# Patient Record
Sex: MALE | Race: WHITE | Employment: OTHER | URBAN - METROPOLITAN AREA
[De-identification: names, ages, dates, MRNs, and addresses within clinical notes are randomized per-mention and may not be internally consistent; named-entity substitution may affect disease eponyms.]

---

## 2018-06-29 ENCOUNTER — HOSPITAL ENCOUNTER (OUTPATIENT)
Age: 53
Discharge: HOME | End: 2018-07-01
Payer: COMMERCIAL

## 2018-06-29 LAB
ALBUMIN SERPL-MCNC: 4.3 G/DL (ref 3.5–5.2)
ALP BLD-CCNC: 65 U/L (ref 40–129)
ALT SERPL-CCNC: 42 U/L (ref 0–40)
ANION GAP SERPL CALCULATED.3IONS-SCNC: 14 MMOL/L (ref 7–16)
AST SERPL-CCNC: 33 U/L (ref 0–39)
BASOPHILS ABSOLUTE: 0.05 E9/L (ref 0–0.2)
BASOPHILS RELATIVE PERCENT: 0.9 % (ref 0–2)
BILIRUB SERPL-MCNC: 1.8 MG/DL (ref 0–1.2)
BUN BLDV-MCNC: 19 MG/DL (ref 6–20)
CALCIUM SERPL-MCNC: 9 MG/DL (ref 8.6–10.2)
CHLORIDE BLD-SCNC: 103 MMOL/L (ref 98–107)
CHOLESTEROL, TOTAL: 135 MG/DL (ref 0–199)
CO2: 26 MMOL/L (ref 22–29)
CREAT SERPL-MCNC: 1 MG/DL (ref 0.7–1.2)
CREATININE URINE: 146 MG/DL (ref 40–278)
EOSINOPHILS ABSOLUTE: 0.05 E9/L (ref 0.05–0.5)
EOSINOPHILS RELATIVE PERCENT: 0.9 % (ref 0–6)
GFR AFRICAN AMERICAN: >60
GFR NON-AFRICAN AMERICAN: >60 ML/MIN/1.73
GLUCOSE BLD-MCNC: 108 MG/DL (ref 74–109)
HBA1C MFR BLD: 6.7 % (ref 4–5.6)
HCT VFR BLD CALC: 50 % (ref 37–54)
HDLC SERPL-MCNC: 27 MG/DL
HEMOGLOBIN: 15.9 G/DL (ref 12.5–16.5)
IMMATURE GRANULOCYTES #: 0.01 E9/L
IMMATURE GRANULOCYTES %: 0.2 % (ref 0–5)
LDL CHOLESTEROL CALCULATED: 56 MG/DL (ref 0–99)
LYMPHOCYTES ABSOLUTE: 1.36 E9/L (ref 1.5–4)
LYMPHOCYTES RELATIVE PERCENT: 23.1 % (ref 20–42)
MCH RBC QN AUTO: 29.8 PG (ref 26–35)
MCHC RBC AUTO-ENTMCNC: 31.8 % (ref 32–34.5)
MCV RBC AUTO: 93.8 FL (ref 80–99.9)
MICROALBUMIN UR-MCNC: <12 MG/L
MICROALBUMIN/CREAT UR-RTO: ABNORMAL (ref 0–30)
MONOCYTES ABSOLUTE: 0.6 E9/L (ref 0.1–0.95)
MONOCYTES RELATIVE PERCENT: 10.2 % (ref 2–12)
NEUTROPHILS ABSOLUTE: 3.81 E9/L (ref 1.8–7.3)
NEUTROPHILS RELATIVE PERCENT: 64.7 % (ref 43–80)
PDW BLD-RTO: 14.5 FL (ref 11.5–15)
PLATELET # BLD: 204 E9/L (ref 130–450)
PMV BLD AUTO: 9.8 FL (ref 7–12)
POTASSIUM SERPL-SCNC: 5.5 MMOL/L (ref 3.5–5)
RBC # BLD: 5.33 E12/L (ref 3.8–5.8)
SODIUM BLD-SCNC: 143 MMOL/L (ref 132–146)
TOTAL PROTEIN: 6.9 G/DL (ref 6.4–8.3)
TRIGL SERPL-MCNC: 260 MG/DL (ref 0–149)
VLDLC SERPL CALC-MCNC: 52 MG/DL
WBC # BLD: 5.9 E9/L (ref 4.5–11.5)

## 2018-06-29 PROCEDURE — 82570 ASSAY OF URINE CREATININE: CPT

## 2018-06-29 PROCEDURE — 80061 LIPID PANEL: CPT

## 2018-06-29 PROCEDURE — 82044 UR ALBUMIN SEMIQUANTITATIVE: CPT

## 2018-06-29 PROCEDURE — 83036 HEMOGLOBIN GLYCOSYLATED A1C: CPT

## 2018-06-29 PROCEDURE — 80053 COMPREHEN METABOLIC PANEL: CPT

## 2018-06-29 PROCEDURE — 85025 COMPLETE CBC W/AUTO DIFF WBC: CPT

## 2018-07-09 ENCOUNTER — HOSPITAL ENCOUNTER (OUTPATIENT)
Age: 53
Discharge: HOME | End: 2018-07-11
Payer: COMMERCIAL

## 2018-07-09 PROCEDURE — 80048 BASIC METABOLIC PNL TOTAL CA: CPT

## 2018-07-10 LAB
ANION GAP SERPL CALCULATED.3IONS-SCNC: 14 MMOL/L (ref 7–16)
BUN BLDV-MCNC: 17 MG/DL (ref 6–20)
CALCIUM SERPL-MCNC: 9 MG/DL (ref 8.6–10.2)
CHLORIDE BLD-SCNC: 104 MMOL/L (ref 98–107)
CO2: 25 MMOL/L (ref 22–29)
CREAT SERPL-MCNC: 1 MG/DL (ref 0.7–1.2)
GFR AFRICAN AMERICAN: >60
GFR NON-AFRICAN AMERICAN: >60 ML/MIN/1.73
GLUCOSE BLD-MCNC: 122 MG/DL (ref 74–109)
POTASSIUM SERPL-SCNC: 4.8 MMOL/L (ref 3.5–5)
SODIUM BLD-SCNC: 143 MMOL/L (ref 132–146)

## 2018-07-17 ENCOUNTER — OFFICE VISIT (OUTPATIENT)
Dept: CARDIOLOGY CLINIC | Age: 53
End: 2018-07-17
Payer: COMMERCIAL

## 2018-07-17 VITALS
DIASTOLIC BLOOD PRESSURE: 76 MMHG | HEART RATE: 69 BPM | BODY MASS INDEX: 44.39 KG/M2 | RESPIRATION RATE: 18 BRPM | WEIGHT: 282.8 LBS | SYSTOLIC BLOOD PRESSURE: 122 MMHG | HEIGHT: 67 IN

## 2018-07-17 DIAGNOSIS — I25.10 CORONARY ARTERY DISEASE, ANGINA PRESENCE UNSPECIFIED, UNSPECIFIED VESSEL OR LESION TYPE, UNSPECIFIED WHETHER NATIVE OR TRANSPLANTED HEART: Primary | ICD-10-CM

## 2018-07-17 DIAGNOSIS — I10 HYPERTENSION, UNSPECIFIED TYPE: ICD-10-CM

## 2018-07-17 PROCEDURE — 99214 OFFICE O/P EST MOD 30 MIN: CPT | Performed by: INTERNAL MEDICINE

## 2018-07-17 PROCEDURE — 93000 ELECTROCARDIOGRAM COMPLETE: CPT | Performed by: INTERNAL MEDICINE

## 2018-07-17 RX ORDER — ATENOLOL 25 MG/1
TABLET ORAL
Qty: 90 TABLET | Refills: 3 | Status: SHIPPED | OUTPATIENT
Start: 2018-07-17 | End: 2019-08-25 | Stop reason: SDUPTHER

## 2018-07-17 RX ORDER — NITROGLYCERIN 0.4 MG/1
0.4 TABLET SUBLINGUAL EVERY 5 MIN PRN
Qty: 25 TABLET | Refills: 1 | Status: SHIPPED
Start: 2018-07-17 | End: 2022-04-28

## 2018-07-17 RX ORDER — ROSUVASTATIN CALCIUM 10 MG/1
TABLET, COATED ORAL
Qty: 90 TABLET | Refills: 3 | Status: SHIPPED | OUTPATIENT
Start: 2018-07-17 | End: 2019-06-08 | Stop reason: SDUPTHER

## 2018-07-17 NOTE — PROGRESS NOTES
 Smoking status: Never Smoker    Smokeless tobacco: Never Used    Alcohol use No    Drug use: No    Sexual activity: Not on file     Other Topics Concern    Not on file     Social History Narrative    No narrative on file     Family History   Problem Relation Age of Onset    Cancer Mother     Cancer Father      Review of Systems:  Heart: as above   Lungs: as above   Eyes: denies changes in vision or discharge. Ears: denies changes in hearing or pain. Nose: denies epistaxis or masses   Throat: denies sore throat or trouble swallowing. Neuro: denies numbness, tingling, tremors. Skin: denies rashes or itching. : denies hematuria, dysuria   GI: denies vomiting, diarrhea   Psych: denies mood changed, anxiety, depression. all others negative. Physical Exam   /76   Pulse 69   Resp 18   Ht 5' 7\" (1.702 m)   Wt 282 lb 12.8 oz (128.3 kg)   BMI 44.29 kg/m²   Constitutional: Oriented to person, place, and time. Well-developed and well-nourished. No distress. Head: Normocephalic and atraumatic. Eyes: EOM are normal. Pupils are equal, round, and reactive to light. Neck: Normal range of motion. Neck supple. No hepatojugular reflux and no JVD present. Carotid bruit is not present. No tracheal deviation present. No thyromegaly present. Cardiovascular: Normal rate, regular rhythm, normal heart sounds and intact distal pulses. Exam reveals no gallop and no friction rub. No murmur heard. Pulmonary/Chest: Effort normal and breath sounds normal. No respiratory distress. No wheezes. No rales. No tenderness. Abdominal: Soft. Bowel sounds are normal. No distension and no mass. No tenderness. No rebound and no guarding. Musculoskeletal: Normal range of motion. No edema and no tenderness. Lymphadenopathy:   No cervical adenopathy. Neurological: Alert and oriented to person, place, and time. Skin: Skin is warm and dry. No rash noted. Not diaphoretic. No erythema.    Psychiatric: Normal mood and affect. Behavior is normal.     EKG:  Normal sinus rhythm, nonspecific ST-T wave changes. ASSESSMENT AND PLAN:  1. CAD:   Prior history of RCA stent and residual LAD disease. Stress 9/12 normal.     Continue same medications. Scot Tracey D.O.   Cardiologist  Cardiology, [facility]

## 2019-01-16 ENCOUNTER — HOSPITAL ENCOUNTER (OUTPATIENT)
Age: 54
Discharge: HOME | End: 2019-01-18
Payer: COMMERCIAL

## 2019-01-16 DIAGNOSIS — E11.9 TYPE 2 DIABETES MELLITUS WITHOUT COMPLICATION, WITHOUT LONG-TERM CURRENT USE OF INSULIN (HCC): ICD-10-CM

## 2019-01-16 DIAGNOSIS — I10 HYPERTENSION, UNSPECIFIED TYPE: ICD-10-CM

## 2019-01-16 PROBLEM — K21.9 GASTROESOPHAGEAL REFLUX DISEASE WITHOUT ESOPHAGITIS: Status: ACTIVE | Noted: 2019-01-16

## 2019-01-16 PROBLEM — E66.813 CLASS 3 SEVERE OBESITY DUE TO EXCESS CALORIES WITH SERIOUS COMORBIDITY AND BODY MASS INDEX (BMI) OF 45.0 TO 49.9 IN ADULT: Status: ACTIVE | Noted: 2019-01-16

## 2019-01-16 PROBLEM — E66.01 CLASS 3 SEVERE OBESITY DUE TO EXCESS CALORIES WITH SERIOUS COMORBIDITY AND BODY MASS INDEX (BMI) OF 45.0 TO 49.9 IN ADULT (HCC): Status: ACTIVE | Noted: 2019-01-16

## 2019-01-16 LAB
ALBUMIN SERPL-MCNC: 4.5 G/DL (ref 3.5–5.2)
ALP BLD-CCNC: 70 U/L (ref 40–129)
ALT SERPL-CCNC: 75 U/L (ref 0–40)
ANION GAP SERPL CALCULATED.3IONS-SCNC: 12 MMOL/L (ref 7–16)
AST SERPL-CCNC: 50 U/L (ref 0–39)
BASOPHILS ABSOLUTE: 0.05 E9/L (ref 0–0.2)
BASOPHILS RELATIVE PERCENT: 1 % (ref 0–2)
BILIRUB SERPL-MCNC: 2 MG/DL (ref 0–1.2)
BUN BLDV-MCNC: 13 MG/DL (ref 6–20)
CALCIUM SERPL-MCNC: 9 MG/DL (ref 8.6–10.2)
CHLORIDE BLD-SCNC: 96 MMOL/L (ref 98–107)
CHOLESTEROL, TOTAL: 141 MG/DL (ref 0–199)
CO2: 29 MMOL/L (ref 22–29)
CREAT SERPL-MCNC: 0.9 MG/DL (ref 0.7–1.2)
CREATININE URINE: 174 MG/DL (ref 40–278)
EOSINOPHILS ABSOLUTE: 0.02 E9/L (ref 0.05–0.5)
EOSINOPHILS RELATIVE PERCENT: 0.4 % (ref 0–6)
GFR AFRICAN AMERICAN: >60
GFR NON-AFRICAN AMERICAN: >60 ML/MIN/1.73
GLUCOSE BLD-MCNC: 200 MG/DL (ref 74–99)
HCT VFR BLD CALC: 49.2 % (ref 37–54)
HDLC SERPL-MCNC: 34 MG/DL
HEMOGLOBIN: 16.2 G/DL (ref 12.5–16.5)
IMMATURE GRANULOCYTES #: 0.02 E9/L
IMMATURE GRANULOCYTES %: 0.4 % (ref 0–5)
LDL CHOLESTEROL CALCULATED: 75 MG/DL (ref 0–99)
LYMPHOCYTES ABSOLUTE: 1.21 E9/L (ref 1.5–4)
LYMPHOCYTES RELATIVE PERCENT: 23.1 % (ref 20–42)
MCH RBC QN AUTO: 29.6 PG (ref 26–35)
MCHC RBC AUTO-ENTMCNC: 32.9 % (ref 32–34.5)
MCV RBC AUTO: 89.9 FL (ref 80–99.9)
MICROALBUMIN UR-MCNC: <12 MG/L
MICROALBUMIN/CREAT UR-RTO: ABNORMAL (ref 0–30)
MONOCYTES ABSOLUTE: 0.46 E9/L (ref 0.1–0.95)
MONOCYTES RELATIVE PERCENT: 8.8 % (ref 2–12)
NEUTROPHILS ABSOLUTE: 3.47 E9/L (ref 1.8–7.3)
NEUTROPHILS RELATIVE PERCENT: 66.3 % (ref 43–80)
PDW BLD-RTO: 13.4 FL (ref 11.5–15)
PLATELET # BLD: 189 E9/L (ref 130–450)
PMV BLD AUTO: 10.1 FL (ref 7–12)
POTASSIUM SERPL-SCNC: 4.4 MMOL/L (ref 3.5–5)
RBC # BLD: 5.47 E12/L (ref 3.8–5.8)
SODIUM BLD-SCNC: 137 MMOL/L (ref 132–146)
TOTAL PROTEIN: 7.1 G/DL (ref 6.4–8.3)
TRIGL SERPL-MCNC: 159 MG/DL (ref 0–149)
VLDLC SERPL CALC-MCNC: 32 MG/DL
WBC # BLD: 5.2 E9/L (ref 4.5–11.5)

## 2019-01-16 PROCEDURE — 83036 HEMOGLOBIN GLYCOSYLATED A1C: CPT

## 2019-01-16 PROCEDURE — 80061 LIPID PANEL: CPT

## 2019-01-16 PROCEDURE — 82044 UR ALBUMIN SEMIQUANTITATIVE: CPT

## 2019-01-16 PROCEDURE — 82570 ASSAY OF URINE CREATININE: CPT

## 2019-01-16 PROCEDURE — 85025 COMPLETE CBC W/AUTO DIFF WBC: CPT

## 2019-01-16 PROCEDURE — 80053 COMPREHEN METABOLIC PANEL: CPT

## 2019-01-17 LAB — HBA1C MFR BLD: 9 % (ref 4–5.6)

## 2019-01-24 LAB — PROSTATE SPECIFIC ANTIGEN: 3.06 NG/ML (ref 0–4)

## 2019-01-28 ENCOUNTER — HOSPITAL ENCOUNTER (OUTPATIENT)
Age: 54
Discharge: HOME | End: 2019-01-30
Payer: COMMERCIAL

## 2019-01-28 DIAGNOSIS — R79.89 ABNORMAL CBC: ICD-10-CM

## 2019-01-28 DIAGNOSIS — R74.8 ELEVATED LIVER ENZYMES: ICD-10-CM

## 2019-01-28 LAB
ALBUMIN SERPL-MCNC: 4.3 G/DL (ref 3.5–5.2)
ALP BLD-CCNC: 61 U/L (ref 40–129)
ALT SERPL-CCNC: 72 U/L (ref 0–40)
ANION GAP SERPL CALCULATED.3IONS-SCNC: 10 MMOL/L (ref 7–16)
AST SERPL-CCNC: 43 U/L (ref 0–39)
BASOPHILS ABSOLUTE: 0.05 E9/L (ref 0–0.2)
BASOPHILS RELATIVE PERCENT: 0.9 % (ref 0–2)
BILIRUB SERPL-MCNC: 1.6 MG/DL (ref 0–1.2)
BUN BLDV-MCNC: 13 MG/DL (ref 6–20)
CALCIUM SERPL-MCNC: 9 MG/DL (ref 8.6–10.2)
CHLORIDE BLD-SCNC: 103 MMOL/L (ref 98–107)
CO2: 28 MMOL/L (ref 22–29)
CREAT SERPL-MCNC: 1 MG/DL (ref 0.7–1.2)
EOSINOPHILS ABSOLUTE: 0.04 E9/L (ref 0.05–0.5)
EOSINOPHILS RELATIVE PERCENT: 0.7 % (ref 0–6)
GFR AFRICAN AMERICAN: >60
GFR NON-AFRICAN AMERICAN: >60 ML/MIN/1.73
GLUCOSE BLD-MCNC: 155 MG/DL (ref 74–99)
HCT VFR BLD CALC: 48.6 % (ref 37–54)
HEMOGLOBIN: 16 G/DL (ref 12.5–16.5)
IMMATURE GRANULOCYTES #: 0.01 E9/L
IMMATURE GRANULOCYTES %: 0.2 % (ref 0–5)
LYMPHOCYTES ABSOLUTE: 1.28 E9/L (ref 1.5–4)
LYMPHOCYTES RELATIVE PERCENT: 23.7 % (ref 20–42)
MCH RBC QN AUTO: 29.6 PG (ref 26–35)
MCHC RBC AUTO-ENTMCNC: 32.9 % (ref 32–34.5)
MCV RBC AUTO: 90 FL (ref 80–99.9)
MONOCYTES ABSOLUTE: 0.52 E9/L (ref 0.1–0.95)
MONOCYTES RELATIVE PERCENT: 9.6 % (ref 2–12)
NEUTROPHILS ABSOLUTE: 3.51 E9/L (ref 1.8–7.3)
NEUTROPHILS RELATIVE PERCENT: 64.9 % (ref 43–80)
PDW BLD-RTO: 13.7 FL (ref 11.5–15)
PLATELET # BLD: 198 E9/L (ref 130–450)
PMV BLD AUTO: 10.7 FL (ref 7–12)
POTASSIUM SERPL-SCNC: 4.9 MMOL/L (ref 3.5–5)
RBC # BLD: 5.4 E12/L (ref 3.8–5.8)
SODIUM BLD-SCNC: 141 MMOL/L (ref 132–146)
TOTAL PROTEIN: 6.7 G/DL (ref 6.4–8.3)
WBC # BLD: 5.4 E9/L (ref 4.5–11.5)

## 2019-01-28 PROCEDURE — 80053 COMPREHEN METABOLIC PANEL: CPT

## 2019-01-28 PROCEDURE — 85025 COMPLETE CBC W/AUTO DIFF WBC: CPT

## 2019-06-07 ENCOUNTER — HOSPITAL ENCOUNTER (OUTPATIENT)
Age: 54
Discharge: HOME | End: 2019-06-09
Payer: COMMERCIAL

## 2019-06-07 DIAGNOSIS — E11.9 TYPE 2 DIABETES MELLITUS WITHOUT COMPLICATION, WITHOUT LONG-TERM CURRENT USE OF INSULIN (HCC): ICD-10-CM

## 2019-06-07 DIAGNOSIS — E78.2 MIXED HYPERLIPIDEMIA: ICD-10-CM

## 2019-06-07 DIAGNOSIS — R74.8 ABNORMAL LIVER ENZYMES: ICD-10-CM

## 2019-06-07 LAB
ALBUMIN SERPL-MCNC: 4.4 G/DL (ref 3.5–5.2)
ALP BLD-CCNC: 69 U/L (ref 40–129)
ALT SERPL-CCNC: 33 U/L (ref 0–40)
ANION GAP SERPL CALCULATED.3IONS-SCNC: 15 MMOL/L (ref 7–16)
AST SERPL-CCNC: 34 U/L (ref 0–39)
BASOPHILS ABSOLUTE: 0.03 E9/L (ref 0–0.2)
BASOPHILS RELATIVE PERCENT: 0.6 % (ref 0–2)
BILIRUB SERPL-MCNC: 1.7 MG/DL (ref 0–1.2)
BUN BLDV-MCNC: 16 MG/DL (ref 6–20)
CALCIUM SERPL-MCNC: 9.4 MG/DL (ref 8.6–10.2)
CHLORIDE BLD-SCNC: 102 MMOL/L (ref 98–107)
CHOLESTEROL, TOTAL: 142 MG/DL (ref 0–199)
CO2: 24 MMOL/L (ref 22–29)
CREAT SERPL-MCNC: 0.9 MG/DL (ref 0.7–1.2)
EOSINOPHILS ABSOLUTE: 0.03 E9/L (ref 0.05–0.5)
EOSINOPHILS RELATIVE PERCENT: 0.6 % (ref 0–6)
FERRITIN: 130 NG/ML
GFR AFRICAN AMERICAN: >60
GFR NON-AFRICAN AMERICAN: >60 ML/MIN/1.73
GLUCOSE BLD-MCNC: 125 MG/DL (ref 74–99)
HBA1C MFR BLD: 6.6 % (ref 4–5.6)
HCT VFR BLD CALC: 49.5 % (ref 37–54)
HDLC SERPL-MCNC: 35 MG/DL
HEMOGLOBIN: 16.2 G/DL (ref 12.5–16.5)
IMMATURE GRANULOCYTES #: 0.01 E9/L
IMMATURE GRANULOCYTES %: 0.2 % (ref 0–5)
INR BLD: 1.1
IRON SATURATION: 28 % (ref 20–55)
IRON: 86 MCG/DL (ref 59–158)
LDL CHOLESTEROL CALCULATED: 79 MG/DL (ref 0–99)
LYMPHOCYTES ABSOLUTE: 1.25 E9/L (ref 1.5–4)
LYMPHOCYTES RELATIVE PERCENT: 24.2 % (ref 20–42)
MCH RBC QN AUTO: 29.3 PG (ref 26–35)
MCHC RBC AUTO-ENTMCNC: 32.7 % (ref 32–34.5)
MCV RBC AUTO: 89.7 FL (ref 80–99.9)
MONOCYTES ABSOLUTE: 0.41 E9/L (ref 0.1–0.95)
MONOCYTES RELATIVE PERCENT: 7.9 % (ref 2–12)
NEUTROPHILS ABSOLUTE: 3.44 E9/L (ref 1.8–7.3)
NEUTROPHILS RELATIVE PERCENT: 66.5 % (ref 43–80)
PDW BLD-RTO: 14 FL (ref 11.5–15)
PLATELET # BLD: 195 E9/L (ref 130–450)
PMV BLD AUTO: 10.4 FL (ref 7–12)
POTASSIUM SERPL-SCNC: 4.4 MMOL/L (ref 3.5–5)
PROTHROMBIN TIME: 12.2 SEC (ref 9.3–12.4)
RBC # BLD: 5.52 E12/L (ref 3.8–5.8)
SODIUM BLD-SCNC: 141 MMOL/L (ref 132–146)
TOTAL IRON BINDING CAPACITY: 304 MCG/DL (ref 250–450)
TOTAL PROTEIN: 7.1 G/DL (ref 6.4–8.3)
TRIGL SERPL-MCNC: 138 MG/DL (ref 0–149)
VLDLC SERPL CALC-MCNC: 28 MG/DL
WBC # BLD: 5.2 E9/L (ref 4.5–11.5)

## 2019-06-07 PROCEDURE — 80053 COMPREHEN METABOLIC PANEL: CPT

## 2019-06-07 PROCEDURE — 82728 ASSAY OF FERRITIN: CPT

## 2019-06-07 PROCEDURE — 86803 HEPATITIS C AB TEST: CPT

## 2019-06-07 PROCEDURE — 83550 IRON BINDING TEST: CPT

## 2019-06-07 PROCEDURE — 82390 ASSAY OF CERULOPLASMIN: CPT

## 2019-06-07 PROCEDURE — 83036 HEMOGLOBIN GLYCOSYLATED A1C: CPT

## 2019-06-07 PROCEDURE — 85025 COMPLETE CBC W/AUTO DIFF WBC: CPT

## 2019-06-07 PROCEDURE — 87340 HEPATITIS B SURFACE AG IA: CPT

## 2019-06-07 PROCEDURE — 80061 LIPID PANEL: CPT

## 2019-06-07 PROCEDURE — 85610 PROTHROMBIN TIME: CPT

## 2019-06-07 PROCEDURE — 83540 ASSAY OF IRON: CPT

## 2019-06-07 PROCEDURE — G0103 PSA SCREENING: HCPCS

## 2019-06-07 PROCEDURE — 82103 ALPHA-1-ANTITRYPSIN TOTAL: CPT

## 2019-06-09 LAB — ALPHA-1 ANTITRYPSIN: 140 MG/DL (ref 90–200)

## 2019-06-10 LAB
HEPATITIS B SURFACE ANTIGEN INTERPRETATION: NORMAL
HEPATITIS C ANTIBODY INTERPRETATION: NORMAL
PROSTATE SPECIFIC ANTIGEN: 3.78 NG/ML (ref 0–4)

## 2019-06-10 RX ORDER — ROSUVASTATIN CALCIUM 10 MG/1
TABLET, COATED ORAL
Qty: 90 TABLET | Refills: 3 | Status: SHIPPED | OUTPATIENT
Start: 2019-06-10 | End: 2020-01-24 | Stop reason: SDUPTHER

## 2019-06-12 LAB — CERULOPLASMIN: 23 MG/DL (ref 15–30)

## 2019-06-14 NOTE — RESULT ENCOUNTER NOTE
Faxed completed to Dr Yessy De La Cruz per order.  Both Chantal and Patsy Duggan spoke with pt regarding results

## 2019-08-26 RX ORDER — ATENOLOL 25 MG/1
TABLET ORAL
Qty: 90 TABLET | Refills: 1 | Status: SHIPPED | OUTPATIENT
Start: 2019-08-26 | End: 2020-01-16

## 2019-10-30 ENCOUNTER — HOSPITAL ENCOUNTER (OUTPATIENT)
Age: 54
Discharge: HOME | End: 2019-11-01
Payer: COMMERCIAL

## 2019-10-30 LAB — PROSTATE SPECIFIC ANTIGEN: 3.98 NG/ML (ref 0–4)

## 2019-10-30 PROCEDURE — 84153 ASSAY OF PSA TOTAL: CPT

## 2019-11-26 ENCOUNTER — HOSPITAL ENCOUNTER (OUTPATIENT)
Age: 54
Discharge: HOME | End: 2019-11-28
Payer: COMMERCIAL

## 2019-11-26 DIAGNOSIS — E11.9 TYPE 2 DIABETES MELLITUS WITHOUT COMPLICATION, WITHOUT LONG-TERM CURRENT USE OF INSULIN (HCC): ICD-10-CM

## 2019-11-26 PROBLEM — G47.33 OSA (OBSTRUCTIVE SLEEP APNEA): Status: ACTIVE | Noted: 2019-11-26

## 2019-11-26 PROBLEM — G44.011 INTRACTABLE EPISODIC CLUSTER HEADACHE: Status: ACTIVE | Noted: 2019-11-26

## 2019-11-26 PROBLEM — R74.8 ABNORMAL LIVER ENZYMES: Status: RESOLVED | Noted: 2019-06-07 | Resolved: 2019-11-26

## 2019-11-26 LAB
ALBUMIN SERPL-MCNC: 4.3 G/DL (ref 3.5–5.2)
ALP BLD-CCNC: 73 U/L (ref 40–129)
ALT SERPL-CCNC: 38 U/L (ref 0–40)
ANION GAP SERPL CALCULATED.3IONS-SCNC: 17 MMOL/L (ref 7–16)
AST SERPL-CCNC: 29 U/L (ref 0–39)
BASOPHILS ABSOLUTE: 0.06 E9/L (ref 0–0.2)
BASOPHILS RELATIVE PERCENT: 0.9 % (ref 0–2)
BILIRUB SERPL-MCNC: 2 MG/DL (ref 0–1.2)
BUN BLDV-MCNC: 14 MG/DL (ref 6–20)
CALCIUM SERPL-MCNC: 9.2 MG/DL (ref 8.6–10.2)
CHLORIDE BLD-SCNC: 99 MMOL/L (ref 98–107)
CHOLESTEROL, TOTAL: 128 MG/DL (ref 0–199)
CO2: 24 MMOL/L (ref 22–29)
CREAT SERPL-MCNC: 1 MG/DL (ref 0.7–1.2)
CREATININE URINE: 237 MG/DL (ref 40–278)
EOSINOPHILS ABSOLUTE: 0.04 E9/L (ref 0.05–0.5)
EOSINOPHILS RELATIVE PERCENT: 0.6 % (ref 0–6)
GFR AFRICAN AMERICAN: >60
GFR NON-AFRICAN AMERICAN: >60 ML/MIN/1.73
GLUCOSE BLD-MCNC: 135 MG/DL (ref 74–99)
HBA1C MFR BLD: 7.9 % (ref 4–5.6)
HCT VFR BLD CALC: 50.2 % (ref 37–54)
HDLC SERPL-MCNC: 34 MG/DL
HEMOGLOBIN: 15.7 G/DL (ref 12.5–16.5)
IMMATURE GRANULOCYTES #: 0.02 E9/L
IMMATURE GRANULOCYTES %: 0.3 % (ref 0–5)
LDL CHOLESTEROL CALCULATED: 68 MG/DL (ref 0–99)
LYMPHOCYTES ABSOLUTE: 1.56 E9/L (ref 1.5–4)
LYMPHOCYTES RELATIVE PERCENT: 23.5 % (ref 20–42)
MCH RBC QN AUTO: 28.6 PG (ref 26–35)
MCHC RBC AUTO-ENTMCNC: 31.3 % (ref 32–34.5)
MCV RBC AUTO: 91.6 FL (ref 80–99.9)
MICROALBUMIN UR-MCNC: <12 MG/L
MICROALBUMIN/CREAT UR-RTO: ABNORMAL (ref 0–30)
MONOCYTES ABSOLUTE: 0.64 E9/L (ref 0.1–0.95)
MONOCYTES RELATIVE PERCENT: 9.7 % (ref 2–12)
NEUTROPHILS ABSOLUTE: 4.31 E9/L (ref 1.8–7.3)
NEUTROPHILS RELATIVE PERCENT: 65 % (ref 43–80)
PDW BLD-RTO: 13.4 FL (ref 11.5–15)
PLATELET # BLD: 219 E9/L (ref 130–450)
PMV BLD AUTO: 10 FL (ref 7–12)
POTASSIUM SERPL-SCNC: 4.8 MMOL/L (ref 3.5–5)
RBC # BLD: 5.48 E12/L (ref 3.8–5.8)
SODIUM BLD-SCNC: 140 MMOL/L (ref 132–146)
TOTAL PROTEIN: 7.1 G/DL (ref 6.4–8.3)
TRIGL SERPL-MCNC: 131 MG/DL (ref 0–149)
VLDLC SERPL CALC-MCNC: 26 MG/DL
WBC # BLD: 6.6 E9/L (ref 4.5–11.5)

## 2019-11-26 PROCEDURE — 83036 HEMOGLOBIN GLYCOSYLATED A1C: CPT

## 2019-11-26 PROCEDURE — 80061 LIPID PANEL: CPT

## 2019-11-26 PROCEDURE — 82044 UR ALBUMIN SEMIQUANTITATIVE: CPT

## 2019-11-26 PROCEDURE — 85025 COMPLETE CBC W/AUTO DIFF WBC: CPT

## 2019-11-26 PROCEDURE — 80053 COMPREHEN METABOLIC PANEL: CPT

## 2019-11-26 PROCEDURE — 82570 ASSAY OF URINE CREATININE: CPT

## 2019-12-11 PROBLEM — R51.9 SUDDEN ONSET OF SEVERE HEADACHE: Status: ACTIVE | Noted: 2019-12-11

## 2019-12-27 ENCOUNTER — HOSPITAL ENCOUNTER (OUTPATIENT)
Age: 54
Discharge: HOME | End: 2019-12-29
Payer: COMMERCIAL

## 2019-12-27 LAB — PROSTATE SPECIFIC ANTIGEN: 4.6 NG/ML (ref 0–4)

## 2019-12-27 PROCEDURE — 84153 ASSAY OF PSA TOTAL: CPT

## 2020-01-03 ENCOUNTER — OFFICE VISIT (OUTPATIENT)
Dept: NEUROLOGY | Age: 55
End: 2020-01-03
Payer: COMMERCIAL

## 2020-01-03 VITALS
RESPIRATION RATE: 18 BRPM | BODY MASS INDEX: 44.71 KG/M2 | HEART RATE: 69 BPM | OXYGEN SATURATION: 96 % | TEMPERATURE: 98.8 F | WEIGHT: 277 LBS | DIASTOLIC BLOOD PRESSURE: 88 MMHG | SYSTOLIC BLOOD PRESSURE: 143 MMHG

## 2020-01-03 PROBLEM — J32.8 CHRONIC FRONTOETHMOIDAL SINUSITIS: Status: ACTIVE | Noted: 2020-01-03

## 2020-01-03 PROCEDURE — 99204 OFFICE O/P NEW MOD 45 MIN: CPT | Performed by: PSYCHIATRY & NEUROLOGY

## 2020-01-03 RX ORDER — TIZANIDINE 4 MG/1
4 TABLET ORAL EVERY 4 HOURS PRN
Qty: 30 TABLET | Refills: 0 | Status: SHIPPED | OUTPATIENT
Start: 2020-01-03 | End: 2020-01-23

## 2020-01-03 ASSESSMENT — ENCOUNTER SYMPTOMS
VOMITING: 0
TROUBLE SWALLOWING: 0
NAUSEA: 0
PHOTOPHOBIA: 0
SHORTNESS OF BREATH: 0

## 2020-01-03 NOTE — PROGRESS NOTES
Recent/remote memory normal. Attention span/concentration normal. Speech fluent. Good comprehension, naming, and repetition. Fund of knowledge appropriate for patient's level of education. Affect normal.    CRANIAL NERVES:  CN I: Not tested. CN II: Fundoscopic exam not performed. CN III, IV, VI: Pupils equal, round and reactive to light; extra ocular movements full and intact. CN V: Facial sensation normal.  CN VII: No facial asymmetry. CN VIII:  Hearing grossly normal bilaterally. No pathologic nystagmus or skew deviation. CN IX, X: Palate elevates symmetrically. CN XI: Shoulder shrug and chin rotation equal with intact strength. CN XII: Tongue protrusion midline. MOTOR: Normal bulk. Tone normal and symmetric throughout. Strength 5/5 throughout. ABNORMAL MOVEMENTS/TREMORS: No     REFLEXES: DTRs 2+; normal and symmetric throughout. Plantar response downgoing. SENSATION: Sensation grossly intact to fine touch, pain/temperature, vibration and position. COORDINATION: Finger-to-nose and heel to shin normal for age and symmetric. Finger tapping and alternating movements normal.    STATION: Negative Romberg. GAIT:  Normal heel, toe and tandem; no ataxia.      DIAGNOSTIC TESTS:   Sodium   Date Value Ref Range Status   11/26/2019 140 132 - 146 mmol/L Final   06/07/2019 141 132 - 146 mmol/L Final   01/28/2019 141 132 - 146 mmol/L Final     Potassium   Date Value Ref Range Status   11/26/2019 4.8 3.5 - 5.0 mmol/L Final   06/07/2019 4.4 3.5 - 5.0 mmol/L Final   01/28/2019 4.9 3.5 - 5.0 mmol/L Final     Chloride   Date Value Ref Range Status   11/26/2019 99 98 - 107 mmol/L Final   06/07/2019 102 98 - 107 mmol/L Final   01/28/2019 103 98 - 107 mmol/L Final     CO2   Date Value Ref Range Status   11/26/2019 24 22 - 29 mmol/L Final   06/07/2019 24 22 - 29 mmol/L Final   01/28/2019 28 22 - 29 mmol/L Final     BUN   Date Value Ref Range Status   11/26/2019 14 6 - 20 mg/dL Final   06/07/2019 16 6 - 20 mg/dL features. · Medication treatment options were discussed in detail with the patient however at this time the patient does not want to take a daily medication for his headaches is after treatment of sinus infection his headaches have significantly improved. · Start on Zanaflex 4 mg as needed for headaches. Side effects of medication were discussed in detail with the patient and he agrees to it. Return if symptoms worsen or fail to improve. Today, I personally spent 45 minutes in direct face-to-face time with the patient, of which greater than 50% was spent in patient education, counseling,about etiology management diagnosis of sinus headache, new daily persistent headaches, medications use and treatment and the side effects. And coordination of care as described above. Patient's current medication list, allergies, problem list and results of all previously ordered testing  were reviewed at today's visit.       Aleisha Busch MD    [facility] Neurology  [address] [place] [address]

## 2020-01-16 RX ORDER — ATENOLOL 25 MG/1
TABLET ORAL
Qty: 90 TABLET | Refills: 3 | Status: SHIPPED
Start: 2020-01-16 | End: 2020-12-17

## 2020-01-23 RX ORDER — TIZANIDINE 4 MG/1
TABLET ORAL
Qty: 30 TABLET | Refills: 0 | Status: SHIPPED | OUTPATIENT
Start: 2020-01-23 | End: 2020-01-24

## 2020-01-24 ENCOUNTER — OFFICE VISIT (OUTPATIENT)
Dept: CARDIOLOGY CLINIC | Age: 55
End: 2020-01-24
Payer: COMMERCIAL

## 2020-01-24 VITALS
DIASTOLIC BLOOD PRESSURE: 84 MMHG | BODY MASS INDEX: 43.16 KG/M2 | WEIGHT: 275 LBS | HEART RATE: 74 BPM | HEIGHT: 67 IN | SYSTOLIC BLOOD PRESSURE: 132 MMHG

## 2020-01-24 PROCEDURE — 99214 OFFICE O/P EST MOD 30 MIN: CPT | Performed by: INTERNAL MEDICINE

## 2020-01-24 PROCEDURE — 93000 ELECTROCARDIOGRAM COMPLETE: CPT | Performed by: INTERNAL MEDICINE

## 2020-01-24 RX ORDER — ROSUVASTATIN CALCIUM 10 MG/1
TABLET, COATED ORAL
Qty: 90 TABLET | Refills: 3 | Status: SHIPPED
Start: 2020-01-24 | End: 2020-04-13

## 2020-01-24 RX ORDER — LOSARTAN POTASSIUM 25 MG/1
25 TABLET ORAL DAILY
Qty: 90 TABLET | Refills: 3 | Status: SHIPPED
Start: 2020-01-24 | End: 2020-06-08 | Stop reason: RX

## 2020-01-24 NOTE — PROGRESS NOTES
TEDDY ALVARADO   OFFICE VISIT  : 65    PROBLEM LIST:  1. Acute non-ST segment elevation myocardial infarction, 2/15/09. A. Heart catheterization, 09:  Near normal LV systolic function, minimal inferobasal hypokinesis, EF 55%. 65-70% mid LAD, 95% critical proximal right coronary artery lesion. Status post balloon angioplasty and placement of 3.0 x 20 mm long Liberte drug-eluting stent. 2. Atypical chest discomfort, 3/8/09. Negative cardiac enzymes. Discharged home for outpatient stress test.  3. Outpatient exercise stress test, 3/9/09:  Fair exercise tolerance with no symptoms or EKG changes of ischemia. Normal perfusion with resting EF 51% increasing to 85% with no wall motion abnormalities. CC: CAD    HPI: The patient presents today for follow up of his CAD status post stent in  with residual LAD disease. Stress normal . No CP or SOB. BP up. Past Medical History:   Diagnosis Date    CAD (coronary artery disease)     HTN (hypertension)     Hyperlipemia     MI, old     2 1/2     No Known Allergies    Current Outpatient Medications   Medication Sig Dispense Refill    rosuvastatin (CRESTOR) 10 MG tablet TAKE 1 TABLET BY MOUTH AT  BEDTIME 90 tablet 3    losartan (COZAAR) 25 MG tablet Take 1 tablet by mouth daily 90 tablet 3    atenolol (TENORMIN) 25 MG tablet TAKE 1 TABLET BY MOUTH  DAILY 90 tablet 3    metFORMIN (GLUCOPHAGE) 500 MG tablet Take 500 mg by mouth daily (with breakfast)      nitroGLYCERIN (NITROSTAT) 0.4 MG SL tablet Place 1 tablet under the tongue every 5 minutes as needed for Chest pain 25 tablet 1    Omega-3 Fatty Acids (FISH OIL PO) Take by mouth      Ascorbic Acid (VITAMIN C) 500 MG tablet Take 500 mg by mouth daily.  aspirin 325 MG tablet Take 325 mg by mouth daily. No current facility-administered medications for this visit.       Social History     Socioeconomic History    Marital status:      Spouse name: Not on file    Number of children: Not on file    Years of education: Not on file    Highest education level: Not on file   Occupational History    Not on file   Social Needs    Financial resource strain: Not on file    Food insecurity:     Worry: Not on file     Inability: Not on file    Transportation needs:     Medical: Not on file     Non-medical: Not on file   Tobacco Use    Smoking status: Never Smoker    Smokeless tobacco: Never Used   Substance and Sexual Activity    Alcohol use: No    Drug use: No    Sexual activity: Not on file   Lifestyle    Physical activity:     Days per week: Not on file     Minutes per session: Not on file    Stress: Not on file   Relationships    Social connections:     Talks on phone: Not on file     Gets together: Not on file     Attends Yarsanism service: Not on file     Active member of club or organization: Not on file     Attends meetings of clubs or organizations: Not on file     Relationship status: Not on file    Intimate partner violence:     Fear of current or ex partner: Not on file     Emotionally abused: Not on file     Physically abused: Not on file     Forced sexual activity: Not on file   Other Topics Concern    Not on file   Social History Narrative    Not on file     Family History   Problem Relation Age of Onset    Cancer Mother     Cancer Father      Review of Systems:  Heart: as above   Lungs: as above   Eyes: denies changes in vision or discharge. Ears: denies changes in hearing or pain. Nose: denies epistaxis or masses   Throat: denies sore throat or trouble swallowing. Neuro: denies numbness, tingling, tremors. Skin: denies rashes or itching. : denies hematuria, dysuria   GI: denies vomiting, diarrhea   Psych: denies mood changed, anxiety, depression. all others negative. Physical Exam   /84   Pulse 74   Ht 5' 7\" (1.702 m)   Wt 275 lb (124.7 kg)   BMI 43.07 kg/m²   Constitutional: Oriented to person, place, and time.  Well-developed and

## 2020-02-03 ENCOUNTER — HOSPITAL ENCOUNTER (OUTPATIENT)
Age: 55
Discharge: HOME | End: 2020-02-05
Payer: COMMERCIAL

## 2020-02-03 LAB — PROSTATE SPECIFIC ANTIGEN: 4.84 NG/ML (ref 0–4)

## 2020-02-03 PROCEDURE — 84153 ASSAY OF PSA TOTAL: CPT

## 2020-02-05 ENCOUNTER — HOSPITAL ENCOUNTER (OUTPATIENT)
Age: 55
Discharge: HOME | End: 2020-02-07
Payer: COMMERCIAL

## 2020-02-05 LAB
ANION GAP SERPL CALCULATED.3IONS-SCNC: 15 MMOL/L (ref 7–16)
BUN BLDV-MCNC: 15 MG/DL (ref 6–20)
CALCIUM SERPL-MCNC: 9.9 MG/DL (ref 8.6–10.2)
CHLORIDE BLD-SCNC: 99 MMOL/L (ref 98–107)
CO2: 24 MMOL/L (ref 22–29)
CREAT SERPL-MCNC: 0.9 MG/DL (ref 0.7–1.2)
GFR AFRICAN AMERICAN: >60
GFR NON-AFRICAN AMERICAN: >60 ML/MIN/1.73
GLUCOSE BLD-MCNC: 314 MG/DL (ref 74–99)
POTASSIUM SERPL-SCNC: 4.6 MMOL/L (ref 3.5–5)
SODIUM BLD-SCNC: 138 MMOL/L (ref 132–146)

## 2020-02-05 PROCEDURE — 80048 BASIC METABOLIC PNL TOTAL CA: CPT

## 2020-02-06 ENCOUNTER — TELEPHONE (OUTPATIENT)
Dept: CARDIOLOGY CLINIC | Age: 55
End: 2020-02-06

## 2020-02-06 NOTE — TELEPHONE ENCOUNTER
----- Message from Raquel Tello DO sent at 2/6/2020 10:21 AM EST -----  Labs okay except blood sugar was up. Raquel Tello D.O.   Cardiologist  Cardiology, [facility]

## 2020-02-27 ENCOUNTER — HOSPITAL ENCOUNTER (OUTPATIENT)
Age: 55
Discharge: HOME | End: 2020-02-29
Payer: COMMERCIAL

## 2020-02-27 LAB — PROSTATE SPECIFIC ANTIGEN: 4.21 NG/ML (ref 0–4)

## 2020-02-27 PROCEDURE — 84153 ASSAY OF PSA TOTAL: CPT

## 2020-04-13 RX ORDER — ROSUVASTATIN CALCIUM 10 MG/1
TABLET, COATED ORAL
Qty: 90 TABLET | Refills: 3 | Status: SHIPPED
Start: 2020-04-13 | End: 2021-03-08

## 2020-04-15 ENCOUNTER — HOSPITAL ENCOUNTER (OUTPATIENT)
Age: 55
Discharge: HOME | End: 2020-04-17
Payer: COMMERCIAL

## 2020-04-15 LAB — PROSTATE SPECIFIC ANTIGEN: 4.15 NG/ML (ref 0–4)

## 2020-04-15 PROCEDURE — 84153 ASSAY OF PSA TOTAL: CPT

## 2020-06-08 ENCOUNTER — TELEPHONE (OUTPATIENT)
Dept: CARDIOLOGY CLINIC | Age: 55
End: 2020-06-08

## 2020-06-08 RX ORDER — LISINOPRIL 5 MG/1
5 TABLET ORAL DAILY
Qty: 30 TABLET | Refills: 5 | Status: SHIPPED
Start: 2020-06-08 | End: 2020-09-23 | Stop reason: SDUPTHER

## 2020-06-08 RX ORDER — LISINOPRIL 5 MG/1
5 TABLET ORAL DAILY
COMMUNITY
End: 2020-06-08 | Stop reason: SDUPTHER

## 2020-08-25 ENCOUNTER — HOSPITAL ENCOUNTER (OUTPATIENT)
Age: 55
Discharge: HOME | End: 2020-08-27
Payer: COMMERCIAL

## 2020-08-25 LAB — PROSTATE SPECIFIC ANTIGEN: 4.35 NG/ML (ref 0–4)

## 2020-08-25 PROCEDURE — 84153 ASSAY OF PSA TOTAL: CPT

## 2020-09-21 ENCOUNTER — HOSPITAL ENCOUNTER (OUTPATIENT)
Age: 55
Discharge: HOME | End: 2020-09-23
Payer: COMMERCIAL

## 2020-09-21 LAB
ALBUMIN SERPL-MCNC: 4.4 G/DL (ref 3.5–5.2)
ALP BLD-CCNC: 68 U/L (ref 40–129)
ALT SERPL-CCNC: 25 U/L (ref 0–40)
ANION GAP SERPL CALCULATED.3IONS-SCNC: 10 MMOL/L (ref 7–16)
AST SERPL-CCNC: 28 U/L (ref 0–39)
BASOPHILS ABSOLUTE: 0.05 E9/L (ref 0–0.2)
BASOPHILS RELATIVE PERCENT: 0.7 % (ref 0–2)
BILIRUB SERPL-MCNC: 1.6 MG/DL (ref 0–1.2)
BUN BLDV-MCNC: 15 MG/DL (ref 6–20)
CALCIUM SERPL-MCNC: 9.8 MG/DL (ref 8.6–10.2)
CHLORIDE BLD-SCNC: 102 MMOL/L (ref 98–107)
CHOLESTEROL, TOTAL: 136 MG/DL (ref 0–199)
CO2: 27 MMOL/L (ref 22–29)
CREAT SERPL-MCNC: 0.9 MG/DL (ref 0.7–1.2)
EOSINOPHILS ABSOLUTE: 0.06 E9/L (ref 0.05–0.5)
EOSINOPHILS RELATIVE PERCENT: 0.9 % (ref 0–6)
GFR AFRICAN AMERICAN: >60
GFR NON-AFRICAN AMERICAN: >60 ML/MIN/1.73
GLUCOSE FASTING: 175 MG/DL (ref 74–99)
HBA1C MFR BLD: 7.3 % (ref 4–5.6)
HCT VFR BLD CALC: 49.1 % (ref 37–54)
HDLC SERPL-MCNC: 42 MG/DL
HEMOGLOBIN: 15.7 G/DL (ref 12.5–16.5)
IMMATURE GRANULOCYTES #: 0.01 E9/L
IMMATURE GRANULOCYTES %: 0.1 % (ref 0–5)
LDL CHOLESTEROL CALCULATED: 69 MG/DL (ref 0–99)
LYMPHOCYTES ABSOLUTE: 1.51 E9/L (ref 1.5–4)
LYMPHOCYTES RELATIVE PERCENT: 22.4 % (ref 20–42)
MCH RBC QN AUTO: 29.4 PG (ref 26–35)
MCHC RBC AUTO-ENTMCNC: 32 % (ref 32–34.5)
MCV RBC AUTO: 91.9 FL (ref 80–99.9)
MONOCYTES ABSOLUTE: 0.54 E9/L (ref 0.1–0.95)
MONOCYTES RELATIVE PERCENT: 8 % (ref 2–12)
NEUTROPHILS ABSOLUTE: 4.58 E9/L (ref 1.8–7.3)
NEUTROPHILS RELATIVE PERCENT: 67.9 % (ref 43–80)
PDW BLD-RTO: 14 FL (ref 11.5–15)
PLATELET # BLD: 191 E9/L (ref 130–450)
PMV BLD AUTO: 10.3 FL (ref 7–12)
POTASSIUM SERPL-SCNC: 5 MMOL/L (ref 3.5–5)
RBC # BLD: 5.34 E12/L (ref 3.8–5.8)
SODIUM BLD-SCNC: 139 MMOL/L (ref 132–146)
TOTAL PROTEIN: 6.8 G/DL (ref 6.4–8.3)
TRIGL SERPL-MCNC: 124 MG/DL (ref 0–149)
TSH SERPL DL<=0.05 MIU/L-ACNC: 1.21 UIU/ML (ref 0.27–4.2)
VLDLC SERPL CALC-MCNC: 25 MG/DL
WBC # BLD: 6.8 E9/L (ref 4.5–11.5)

## 2020-09-21 PROCEDURE — 83036 HEMOGLOBIN GLYCOSYLATED A1C: CPT

## 2020-09-21 PROCEDURE — 80061 LIPID PANEL: CPT

## 2020-09-21 PROCEDURE — 85025 COMPLETE CBC W/AUTO DIFF WBC: CPT

## 2020-09-21 PROCEDURE — 84443 ASSAY THYROID STIM HORMONE: CPT

## 2020-09-21 PROCEDURE — 80053 COMPREHEN METABOLIC PANEL: CPT

## 2020-09-23 PROBLEM — R51.9 SUDDEN ONSET OF SEVERE HEADACHE: Status: RESOLVED | Noted: 2019-12-11 | Resolved: 2020-09-23

## 2020-09-23 PROBLEM — G44.011 INTRACTABLE EPISODIC CLUSTER HEADACHE: Status: RESOLVED | Noted: 2019-11-26 | Resolved: 2020-09-23

## 2020-09-23 PROBLEM — J32.8 CHRONIC FRONTOETHMOIDAL SINUSITIS: Status: RESOLVED | Noted: 2020-01-03 | Resolved: 2020-09-23

## 2020-10-27 PROBLEM — S86.812A STRAIN OF LEFT CALF MUSCLE: Status: ACTIVE | Noted: 2020-10-27

## 2020-12-17 RX ORDER — ATENOLOL 25 MG/1
TABLET ORAL
Qty: 90 TABLET | Refills: 0 | Status: SHIPPED
Start: 2020-12-17 | End: 2021-03-08

## 2021-03-08 RX ORDER — ATENOLOL 25 MG/1
TABLET ORAL
Qty: 90 TABLET | Refills: 0 | Status: SHIPPED | OUTPATIENT
Start: 2021-03-08 | End: 2021-03-29 | Stop reason: SDUPTHER

## 2021-03-08 RX ORDER — ROSUVASTATIN CALCIUM 10 MG/1
TABLET, COATED ORAL
Qty: 90 TABLET | Refills: 0 | Status: SHIPPED | OUTPATIENT
Start: 2021-03-08 | End: 2021-03-29 | Stop reason: SDUPTHER

## 2021-03-29 ENCOUNTER — OFFICE VISIT (OUTPATIENT)
Dept: CARDIOLOGY CLINIC | Age: 56
End: 2021-03-29
Payer: COMMERCIAL

## 2021-03-29 VITALS
RESPIRATION RATE: 16 BRPM | BODY MASS INDEX: 40.53 KG/M2 | SYSTOLIC BLOOD PRESSURE: 124 MMHG | HEIGHT: 67 IN | WEIGHT: 258.2 LBS | HEART RATE: 71 BPM | DIASTOLIC BLOOD PRESSURE: 80 MMHG

## 2021-03-29 DIAGNOSIS — I25.10 CORONARY ARTERY DISEASE, ANGINA PRESENCE UNSPECIFIED, UNSPECIFIED VESSEL OR LESION TYPE, UNSPECIFIED WHETHER NATIVE OR TRANSPLANTED HEART: Primary | ICD-10-CM

## 2021-03-29 DIAGNOSIS — I10 HYPERTENSION, UNSPECIFIED TYPE: ICD-10-CM

## 2021-03-29 PROCEDURE — 99214 OFFICE O/P EST MOD 30 MIN: CPT | Performed by: INTERNAL MEDICINE

## 2021-03-29 PROCEDURE — 93000 ELECTROCARDIOGRAM COMPLETE: CPT | Performed by: INTERNAL MEDICINE

## 2021-03-29 RX ORDER — ATENOLOL 25 MG/1
25 TABLET ORAL DAILY
Qty: 90 TABLET | Refills: 3 | Status: SHIPPED
Start: 2021-03-29 | End: 2021-04-20 | Stop reason: SDUPTHER

## 2021-03-29 RX ORDER — LISINOPRIL 5 MG/1
5 TABLET ORAL DAILY
Qty: 90 TABLET | Refills: 3 | Status: SHIPPED
Start: 2021-03-29 | End: 2021-08-06 | Stop reason: SDUPTHER

## 2021-03-29 RX ORDER — ROSUVASTATIN CALCIUM 10 MG/1
10 TABLET, COATED ORAL DAILY
Qty: 90 TABLET | Refills: 3 | Status: SHIPPED
Start: 2021-03-29 | End: 2021-12-29

## 2021-03-29 NOTE — PROGRESS NOTES
children: Not on file    Years of education: Not on file    Highest education level: Not on file   Occupational History    Not on file   Social Needs    Financial resource strain: Not on file    Food insecurity     Worry: Not on file     Inability: Not on file    Transportation needs     Medical: Not on file     Non-medical: Not on file   Tobacco Use    Smoking status: Never Smoker    Smokeless tobacco: Never Used   Substance and Sexual Activity    Alcohol use: No    Drug use: No    Sexual activity: Not on file   Lifestyle    Physical activity     Days per week: Not on file     Minutes per session: Not on file    Stress: Not on file   Relationships    Social connections     Talks on phone: Not on file     Gets together: Not on file     Attends Rastafarian service: Not on file     Active member of club or organization: Not on file     Attends meetings of clubs or organizations: Not on file     Relationship status: Not on file    Intimate partner violence     Fear of current or ex partner: Not on file     Emotionally abused: Not on file     Physically abused: Not on file     Forced sexual activity: Not on file   Other Topics Concern    Not on file   Social History Narrative    Not on file     Family History   Problem Relation Age of Onset    Cancer Mother     Cancer Father      Review of Systems:  Heart: as above   Lungs: as above   Eyes: denies changes in vision or discharge. Ears: denies changes in hearing or pain. Nose: denies epistaxis or masses   Throat: denies sore throat or trouble swallowing. Neuro: denies numbness, tingling, tremors. Skin: denies rashes or itching. : denies hematuria, dysuria   GI: denies vomiting, diarrhea   Psych: denies mood changed, anxiety, depression. all others negative. Physical Exam   /80   Pulse 71   Resp 16   Ht 5' 7\" (1.702 m)   Wt 258 lb 3.2 oz (117.1 kg)   BMI 40.44 kg/m²   Constitutional: Oriented to person, place, and time. Well-developed and well-nourished. No distress. Head: Normocephalic and atraumatic. Eyes: EOM are normal. Pupils are equal, round, and reactive to light. Neck: Normal range of motion. Neck supple. No hepatojugular reflux and no JVD present. Carotid bruit is not present. No tracheal deviation present. No thyromegaly present. Cardiovascular: Normal rate, regular rhythm, normal heart sounds and intact distal pulses. Exam reveals no gallop and no friction rub. No murmur heard. Pulmonary/Chest: Effort normal and breath sounds normal. No respiratory distress. No wheezes. No rales. No tenderness. Abdominal: Soft. Bowel sounds are normal. No distension and no mass. No tenderness. No rebound and no guarding. Musculoskeletal: Normal range of motion. No edema and no tenderness. Lymphadenopathy:   No cervical adenopathy. Neurological: Alert and oriented to person, place, and time. Skin: Skin is warm and dry. No rash noted. Not diaphoretic. No erythema. Psychiatric: Normal mood and affect. Behavior is normal.     EKG:  Normal sinus rhythm, nonspecific ST-T wave changes. ASSESSMENT AND PLAN:  1. CAD:   Prior history of RCA stent and residual LAD disease. Stress 9/12 normal.     Continue ASA/BB/statin. 2. HTN: Observe on ACEI/BB. 3. DM: Per PCP. 4. Dizziness: Hydration. Mira Nguyen D.O.   Cardiologist  Cardiology, [facility]

## 2021-04-14 PROBLEM — S86.812A STRAIN OF LEFT CALF MUSCLE: Status: RESOLVED | Noted: 2020-10-27 | Resolved: 2021-04-14

## 2021-04-19 ENCOUNTER — IMMUNIZATION (OUTPATIENT)
Dept: PRIMARY CARE CLINIC | Age: 56
End: 2021-04-19
Payer: COMMERCIAL

## 2021-04-19 PROCEDURE — 0011A COVID-19, MODERNA VACCINE 100MCG/0.5ML DOSE: CPT | Performed by: PHYSICIAN ASSISTANT

## 2021-04-19 PROCEDURE — 91301 COVID-19, MODERNA VACCINE 100MCG/0.5ML DOSE: CPT | Performed by: PHYSICIAN ASSISTANT

## 2021-05-17 ENCOUNTER — IMMUNIZATION (OUTPATIENT)
Dept: PRIMARY CARE CLINIC | Age: 56
End: 2021-05-17
Payer: COMMERCIAL

## 2021-05-17 PROCEDURE — 91301 COVID-19, MODERNA VACCINE 100MCG/0.5ML DOSE: CPT | Performed by: PHYSICIAN ASSISTANT

## 2021-05-17 PROCEDURE — 0012A COVID-19, MODERNA VACCINE 100MCG/0.5ML DOSE: CPT | Performed by: PHYSICIAN ASSISTANT

## 2021-12-29 RX ORDER — ATENOLOL 25 MG/1
25 TABLET ORAL DAILY
Qty: 90 TABLET | Refills: 0 | Status: SHIPPED
Start: 2021-12-29 | End: 2022-01-25 | Stop reason: SDUPTHER

## 2021-12-29 RX ORDER — ROSUVASTATIN CALCIUM 10 MG/1
10 TABLET, COATED ORAL DAILY
Qty: 90 TABLET | Refills: 0 | Status: SHIPPED
Start: 2021-12-29 | End: 2022-01-25 | Stop reason: SDUPTHER

## 2021-12-30 ENCOUNTER — IMMUNIZATION (OUTPATIENT)
Dept: PRIMARY CARE CLINIC | Age: 56
End: 2021-12-30
Payer: COMMERCIAL

## 2021-12-30 PROCEDURE — 91306 COVID-19, MODERNA BOOSTER VACCINE 0.25ML DOSE: CPT | Performed by: NURSE PRACTITIONER

## 2021-12-30 PROCEDURE — 0064A COVID-19, MODERNA BOOSTER VACCINE 0.25ML DOSE: CPT | Performed by: NURSE PRACTITIONER

## 2022-01-25 RX ORDER — ATENOLOL 25 MG/1
25 TABLET ORAL DAILY
Qty: 90 TABLET | Refills: 0 | Status: SHIPPED
Start: 2022-01-25 | End: 2022-04-28 | Stop reason: SDUPTHER

## 2022-01-25 RX ORDER — ROSUVASTATIN CALCIUM 10 MG/1
10 TABLET, COATED ORAL DAILY
Qty: 90 TABLET | Refills: 0 | Status: SHIPPED
Start: 2022-01-25 | End: 2022-04-28 | Stop reason: SDUPTHER

## 2022-04-28 ENCOUNTER — OFFICE VISIT (OUTPATIENT)
Dept: CARDIOLOGY CLINIC | Age: 57
End: 2022-04-28
Payer: COMMERCIAL

## 2022-04-28 VITALS
SYSTOLIC BLOOD PRESSURE: 124 MMHG | HEIGHT: 67 IN | BODY MASS INDEX: 42.6 KG/M2 | DIASTOLIC BLOOD PRESSURE: 80 MMHG | HEART RATE: 64 BPM | WEIGHT: 271.4 LBS | RESPIRATION RATE: 18 BRPM

## 2022-04-28 DIAGNOSIS — I25.10 CORONARY ARTERY DISEASE, UNSPECIFIED VESSEL OR LESION TYPE, UNSPECIFIED WHETHER ANGINA PRESENT, UNSPECIFIED WHETHER NATIVE OR TRANSPLANTED HEART: Primary | ICD-10-CM

## 2022-04-28 DIAGNOSIS — I10 HYPERTENSION, UNSPECIFIED TYPE: ICD-10-CM

## 2022-04-28 PROCEDURE — 99214 OFFICE O/P EST MOD 30 MIN: CPT | Performed by: INTERNAL MEDICINE

## 2022-04-28 PROCEDURE — 93000 ELECTROCARDIOGRAM COMPLETE: CPT | Performed by: INTERNAL MEDICINE

## 2022-04-28 RX ORDER — LISINOPRIL 5 MG/1
5 TABLET ORAL DAILY
Qty: 90 TABLET | Refills: 3 | Status: SHIPPED
Start: 2022-04-28 | End: 2022-09-28 | Stop reason: SDUPTHER

## 2022-04-28 RX ORDER — SILDENAFIL 100 MG/1
100 TABLET, FILM COATED ORAL DAILY PRN
Qty: 10 TABLET | Refills: 5 | Status: SHIPPED | OUTPATIENT
Start: 2022-04-28

## 2022-04-28 RX ORDER — ATENOLOL 25 MG/1
25 TABLET ORAL DAILY
Qty: 90 TABLET | Refills: 3 | Status: SHIPPED | OUTPATIENT
Start: 2022-04-28

## 2022-04-28 RX ORDER — ROSUVASTATIN CALCIUM 10 MG/1
10 TABLET, COATED ORAL DAILY
Qty: 90 TABLET | Refills: 3 | Status: SHIPPED | OUTPATIENT
Start: 2022-04-28

## 2022-04-28 NOTE — PROGRESS NOTES
TEDDY ALVARADO   OFFICE VISIT  : 65    PROBLEM LIST:  1. Acute non-ST segment elevation myocardial infarction, 2/15/09. A. Heart catheterization, 09:  Near normal LV systolic function, minimal inferobasal hypokinesis, EF 55%. 65-70% mid LAD, 95% critical proximal right coronary artery lesion. Status post balloon angioplasty and placement of 3.0 x 20 mm long Liberte drug-eluting stent. 2. Atypical chest discomfort, 3/8/09. Negative cardiac enzymes. Discharged home for outpatient stress test.  3. Outpatient exercise stress test, 3/9/09:  Fair exercise tolerance with no symptoms or EKG changes of ischemia. Normal perfusion with resting EF 51% increasing to 85% with no wall motion abnormalities. CC: CAD    HPI: The patient presents today for follow up of his CAD status post stent in  with residual LAD disease. Stress normal . No CP or SOB. Some vague dizziness. BP okay. Past Medical History:   Diagnosis Date    CAD (coronary artery disease)     HTN (hypertension)     Hyperlipemia     MI, old     2 1/2     No Known Allergies    Current Outpatient Medications   Medication Sig Dispense Refill    glipiZIDE (GLUCOTROL XL) 10 MG extended release tablet Take 1 tablet by mouth daily (with breakfast) New dose 2021 90 tablet 1    lisinopril (PRINIVIL;ZESTRIL) 5 MG tablet Take 1 tablet by mouth daily 90 tablet 1    atenolol (TENORMIN) 25 MG tablet Take 1 tablet by mouth daily 90 tablet 0    rosuvastatin (CRESTOR) 10 MG tablet Take 1 tablet by mouth daily 90 tablet 0    metFORMIN (GLUCOPHAGE) 500 MG tablet Take 1 tablet by mouth 2 times daily (with meals) New directions as of 2021 180 tablet 0    nitroGLYCERIN (NITROSTAT) 0.4 MG SL tablet Place 1 tablet under the tongue every 5 minutes as needed for Chest pain 25 tablet 1    Ascorbic Acid (VITAMIN C) 500 MG tablet Take 500 mg by mouth daily.  aspirin 325 MG tablet Take 325 mg by mouth daily.         lisinopril (PRINIVIL;ZESTRIL) 5 MG tablet Take 1 tablet by mouth daily (Patient not taking: Reported on 4/28/2022) 7 tablet 0    glipiZIDE (GLUCOTROL XL) 10 MG extended release tablet Take 1 tablet by mouth daily (Patient not taking: Reported on 4/28/2022) 7 tablet 0     No current facility-administered medications for this visit. Social History     Socioeconomic History    Marital status:      Spouse name: Not on file    Number of children: Not on file    Years of education: Not on file    Highest education level: Not on file   Occupational History    Not on file   Tobacco Use    Smoking status: Never Smoker    Smokeless tobacco: Never Used   Vaping Use    Vaping Use: Never used   Substance and Sexual Activity    Alcohol use: No    Drug use: No    Sexual activity: Not on file   Other Topics Concern    Not on file   Social History Narrative    Not on file     Social Determinants of Health     Financial Resource Strain: Low Risk     Difficulty of Paying Living Expenses: Not hard at all   Food Insecurity: No Food Insecurity    Worried About [address] in the Last Year: Never true    [address] in the Last Year: Never true   Transportation Needs:     Lack of Transportation (Medical): Not on file    Lack of Transportation (Non-Medical):  Not on file   Physical Activity:     Days of Exercise per Week: Not on file    Minutes of Exercise per Session: Not on file   Stress:     Feeling of Stress : Not on file   Social Connections:     Frequency of Communication with Friends and Family: Not on file    Frequency of Social Gatherings with Friends and Family: Not on file    Attends Spiritism Services: Not on file    Active Member of Clubs or Organizations: Not on file    Attends Club or Organization Meetings: Not on file    Marital Status: Not on file   Intimate Partner Violence:     Fear of Current or Ex-Partner: Not on file    Emotionally Abused: Not on file    Physically Abused: Not mood and affect. Behavior is normal.     EKG:  Normal sinus rhythm, nonspecific ST-T wave changes. ASSESSMENT AND PLAN:  1. CAD:   Prior history of RCA stent and residual LAD disease. Stress 9/12 normal.     Continue ASA/BB/statin. 2. HTN: Observe on ACEI/BB. 3. DM: Per PCP. 4. ED: Trial sildenafil. Alban Arevalo D.O.   Cardiologist  Cardiology, [facility]

## 2022-07-30 ENCOUNTER — HOSPITAL ENCOUNTER (EMERGENCY)
Age: 57
Discharge: HOME | End: 2022-07-30
Payer: COMMERCIAL

## 2022-07-30 VITALS
HEIGHT: 67 IN | HEART RATE: 80 BPM | OXYGEN SATURATION: 98 % | SYSTOLIC BLOOD PRESSURE: 150 MMHG | RESPIRATION RATE: 20 BRPM | DIASTOLIC BLOOD PRESSURE: 70 MMHG | WEIGHT: 249 LBS | TEMPERATURE: 99.6 F | BODY MASS INDEX: 39.08 KG/M2

## 2022-07-30 DIAGNOSIS — U07.1 UPPER RESPIRATORY TRACT INFECTION DUE TO COVID-19 VIRUS: Primary | ICD-10-CM

## 2022-07-30 DIAGNOSIS — J06.9 UPPER RESPIRATORY TRACT INFECTION DUE TO COVID-19 VIRUS: Primary | ICD-10-CM

## 2022-07-30 PROCEDURE — 99282 EMERGENCY DEPT VISIT SF MDM: CPT

## 2022-07-30 ASSESSMENT — PAIN - FUNCTIONAL ASSESSMENT: PAIN_FUNCTIONAL_ASSESSMENT: NONE - DENIES PAIN

## 2022-07-30 ASSESSMENT — LIFESTYLE VARIABLES
HOW MANY STANDARD DRINKS CONTAINING ALCOHOL DO YOU HAVE ON A TYPICAL DAY: PATIENT DOES NOT DRINK
HOW OFTEN DO YOU HAVE A DRINK CONTAINING ALCOHOL: NEVER

## 2022-07-31 NOTE — ED PROVIDER NOTES
[facility]  Department of Emergency Medicine   ED  Encounter Note  Admit Date/RoomTime: 2022  8:56 PM  ED Room:   NAME: Elizabeth Stinson  : 1965  MRN: [id number]     Chief Complaint:  Sinusitis (Sinus congestion and cough starting last week, 1 COVID test at home//)    HISTORY OF PRESENT ILLNESS        Elizabeth Stinson is a 64 y.o. male who presents to the ED for COVID evaluation. Patient states he started with a cold last week. Sinus congestion. Runny nose. Cough. Did take 2 at home COVID test last week that came back negative. He states the cold-like symptoms continued. Started coughing. Then took another COVID test today and it came back positive. He does state he talked to his family doctor last week with a cold started and was on a Z-Alban. That did not help. Then called her again yesterday complaining of the cough and they prescribed Tessalon Perles, which have helped. He denies any fevers or chills. Denies loss of taste or loss of smell. Denies shortness of breath. Cough is nonproductive. Denies any nausea, vomiting or diarrhea. Patient is concerned because his wife is immunocompromised and just wanted to talk about COVID illness in the house. Symptoms are mild to moderate in severity. Relief with the Tessalon Perles. Has not taken any cold or cough medicine. ROS   Pertinent positives and negatives are stated within HPI, all other systems reviewed and are negative. Past Medical History:  has a past medical history of CAD (coronary artery disease), HTN (hypertension), Hyperlipemia, and MI, old. Surgical History:  has a past surgical history that includes Coronary angioplasty with stent. Social History:  reports that he has never smoked. He has never used smokeless tobacco. He reports that he does not drink alcohol and does not use drugs. Family History: family history includes Cancer in his father and mother.      Allergies: Patient has no known allergies. PHYSICAL EXAM   Oxygen Saturation Interpretation: Normal on room air analysis. ED Triage Vitals [07/30/22 2053]   BP Temp Temp Source Heart Rate Resp SpO2 Height Weight   (!) 150/70 99.6 °F (37.6 °C) Oral 80 20 98 % 5' 7\" (1.702 m) 249 lb (112.9 kg)       General:  NAD. Alert and Oriented. Well-appearing. Skin:  Warm, dry. No rashes. Head:  Normocephalic. Atraumatic. Eyes:  EOMI. Conjunctiva normal.  ENT:  Oral mucosa moist.  Airway patent. Posterior pharynx pink without erythema, without swelling, without exudate. Uvula midline with equal rise and without edema. Bilateral ear canals patent with minimal cerumen. Bilateral TM's without erythema, without bulging. Nasal turbinates with minimal swelling. Frontal and maxillary sinuses nontender to palpation. Neck:  Supple. Normal ROM. Respiratory:  No respiratory distress. No labored breathing. Lungs clear without rales, rhonchi or wheezing. Cardiovascular:  Regular rate. No Murmur. No peripheral edema. Extremities warm and good color. Extremities:  Normal ROM. Nontender to palpation. Atraumatic. Back:  Normal ROM. Nontender to palpation. Neuro:  Alert and Oriented to person, place, time and situation. Normal LOC. Moves all extremities. Speech fluent. Psych:  Calm and Cooperative. Normal thought process. Normal judgement. Lab / Imaging Results   (All laboratory and radiology results have been personally reviewed by myself)  Labs:  No results found for this visit on 07/30/22. Imaging: All Radiology results interpreted by Radiologist unless otherwise noted. No orders to display       ED Course / Medical Decision Making   Medications - No data to display     Re-examination:  7/30/22       Time:   Patients condition . Consult(s):   None    Procedure(s):   None    MDM:   Discussed with him COVID illness.   It is a risk him being in the home with his wife, but all he can really do is spray everything with Lysol and wipe with antibacterial wipes. Continue the [employer]. He can also add on Tylenol cold and sinus medication for the congestions. Explained to him this mimics a cold and to just let it run its course with. Plan of Care/Counseling:  Javi Merrill reviewed today's visit with the patient in addition to providing specific details for the plan of care and counseling regarding the diagnosis and prognosis. Questions are answered at this time and are agreeable with the plan. ASSESSMENT     1. Upper respiratory tract infection due to COVID-19 virus      PLAN   Discharged home. Patient condition is good    New Medications     New Prescriptions    No medications on file     Electronically signed by KRISHNA Merrill   DD: 7/30/22  **This report was transcribed using voice recognition software. Every effort was made to ensure accuracy; however, inadvertent computerized transcription errors may be present.   END OF ED PROVIDER NOTE       Javi Merrill  07/30/22 [id number]

## 2022-07-31 NOTE — ED NOTES
Discharge instructions given. Patient states verbal understanding. Ambulatory to exit.        Iva Grant RN  07/30/22 [id number]

## 2022-12-15 ENCOUNTER — HOSPITAL ENCOUNTER (OUTPATIENT)
Age: 57
Discharge: HOME | End: 2022-12-17

## 2023-01-25 ENCOUNTER — TELEPHONE (OUTPATIENT)
Dept: CASE MANAGEMENT | Age: 58
End: 2023-01-25

## 2023-01-25 NOTE — TELEPHONE ENCOUNTER
Met with patient during his initial consultation with Dr Nydia Adams for his recent Prostate Cancer diagnosis. Introduced myself and explained my role with patients receiving treatment at our center. Patient was friendly and receptive. He states he is doing good and got all of his questions answered during his visit with the doctor and nurse today. He denies any needs at this time and has no transportation needs. Copy of his pathology findings given including cancer type. Next steps include Hormone Therapy with Orgovyx, Space OAR, CT simulation and Radiation planning and treatments per Dr Kristian Byers recommendations and follow up care. Provided patient with literature on Patient Resource Prostate Cancer, Cancer Care Online Prostate Cancer Support Group, Local Resources for Cancer Patients and [employer] List. Provided with my contact information and encouraged patient to call with questions, concerns or needs. Verbalizes understanding. Patient appreciative of visit. Will continue to follow.

## 2023-03-01 ENCOUNTER — PREP FOR PROCEDURE (OUTPATIENT)
Dept: UROLOGY | Age: 58
End: 2023-03-01

## 2023-03-01 RX ORDER — SODIUM CHLORIDE 9 MG/ML
INJECTION, SOLUTION INTRAVENOUS CONTINUOUS
Status: CANCELLED | OUTPATIENT
Start: 2023-03-01

## 2023-03-01 RX ORDER — SODIUM CHLORIDE 0.9 % (FLUSH) 0.9 %
5-40 SYRINGE (ML) INJECTION EVERY 12 HOURS SCHEDULED
Status: CANCELLED | OUTPATIENT
Start: 2023-03-01

## 2023-03-01 RX ORDER — SODIUM CHLORIDE 9 MG/ML
INJECTION, SOLUTION INTRAVENOUS PRN
Status: CANCELLED | OUTPATIENT
Start: 2023-03-01

## 2023-03-01 RX ORDER — SODIUM CHLORIDE 0.9 % (FLUSH) 0.9 %
5-40 SYRINGE (ML) INJECTION PRN
Status: CANCELLED | OUTPATIENT
Start: 2023-03-01

## 2023-03-02 RX ORDER — MAGNESIUM 30 MG
30 TABLET ORAL 2 TIMES DAILY
COMMUNITY

## 2023-03-02 RX ORDER — ZINC GLUCONATE 50 MG
50 TABLET ORAL DAILY
COMMUNITY

## 2023-03-02 RX ORDER — CHOLECALCIFEROL (VITAMIN D3) 1250 MCG
1 CAPSULE ORAL DAILY
COMMUNITY

## 2023-03-02 NOTE — PROGRESS NOTES
[facility] PRE-ADMISSION TESTING INSTRUCTIONS    The Preadmission Testing patient is instructed accordingly using the following criteria (check applicable):    ARRIVAL INSTRUCTIONS:  [x] Parking the day of Surgery is located in the Main Entrance lot. Upon entering the door, make an immediate right to the surgery reception desk    [x] Bring photo ID and insurance card    [] Bring in a copy of Living will or Durable Power of  papers. [x] Please be sure to arrange for responsible adult to provide transportation to and from the hospital    [x] Please arrange for responsible adult to be with you for the 24 hour period post procedure due to having anesthesia    [x] If you awake am of surgery not feeling well or have temperature >100 please call [phone]    GENERAL INSTRUCTIONS:    [x] Nothing by mouth after midnight, including gum, candy, mints or water    [x] You may brush your teeth, but do not swallow any water    [x] Take medications as instructed with 1-2 oz of water    [x] Stop herbal supplements and vitamins 5 days prior to procedure    [x] Follow preop dosing of blood thinners per physician instructions    [] Take 1/2 dose of evening insulin, but no insulin after midnight    [x] No oral diabetic medications after midnight    [x] If diabetic and have low blood sugar or feel symptomatic, take 1-2oz apple juice only    [] Bring inhalers day of surgery    [] Bring C-PAP/ Bi-Pap day of surgery    [] Bring urine specimen day of surgery    [x] Shower or bath with soap, lather and rinse well, AM of Surgery, no lotion, powders or creams     [] Follow bowel prep as instructed per surgeon    [x] No tobacco products within 24 hours of surgery     [x] No alcohol or illegal drug use within 24 hours of surgery.     [x] Jewelry, body piercing's, eyeglasses, contact lenses and dentures are not permitted into surgery (bring cases)      [] Please do not wear any nail polish, make up or hair products on the day of surgery    [x] You can expect a call the business day prior to procedure to notify you if your arrival time changes    [x] If you receive a survey after surgery we would greatly appreciate your comments    [] Parent/guardian of a minor must accompany their child and remain on the premises  the entire time they are under our care     [] Pediatric patients may bring favorite toy, blanket or comfort item with them    [] A caregiver or family member must remain with the patient during their stay if they are mentally handicapped, have dementia, disoriented or unable to use a call light or would be a safety concern if left unattended    [x] Please notify surgeon if you develop any illness between now and time of surgery (cold, cough, sore throat, fever, nausea, vomiting) or any signs of infections  including skin, wounds, and dental.    [x]  The Outpatient Pharmacy is available to fill your prescription here on your day of surgery, ask your preop nurse for details    [x] Other instructions: wear loose, comfortable clothing    EDUCATIONAL MATERIALS PROVIDED:    [] PAT Preoperative Education Packet/Booklet     [] Medication List    [] Transfusion bracelet applied with instructions    [] Shower with soap, lather and rinse well, and use CHG wipes provided the evening before surgery as instructed    [] Incentive spirometer with instructions

## 2023-03-03 ENCOUNTER — ANESTHESIA EVENT (OUTPATIENT)
Dept: OPERATING ROOM | Age: 58
End: 2023-03-03
Payer: COMMERCIAL

## 2023-03-03 ASSESSMENT — LIFESTYLE VARIABLES: SMOKING_STATUS: 0

## 2023-03-03 NOTE — ANESTHESIA PRE PROCEDURE
Department of Anesthesiology  Preprocedure Note       Name:  Alex Ruiz   Age:  62 y.o.  :  1965                                          MRN:  [id number]         Date:  3/3/2023      Surgeon: Nathaly Bragg):  Richard Yarbrough MD    Procedure: Procedure(s):  ULTRASOUND GUIDED TRANSPERINEAL IMPLANT OF SPACEOAR    Medications prior to admission:   Prior to Admission medications    Medication Sig Start Date End Date Taking? Authorizing Provider   zinc gluconate 50 MG tablet Take 50 mg by mouth daily   Yes Historical Provider, MD   Cholecalciferol (VITAMIN D3) 1.25 MG (74994 UT) CAPS Take 1 capsule by mouth daily   Yes Historical Provider, MD   magnesium 30 MG tablet Take 30 mg by mouth 2 times daily   Yes Historical Provider, MD   glipiZIDE (GLUCOTROL XL) 10 MG extended release tablet Take 1 tablet by mouth daily (with breakfast) for 14 days New dose 2021 9/28/22 10/12/22  Molly Paez DO   lisinopril (PRINIVIL;ZESTRIL) 5 MG tablet Take 1 tablet by mouth daily for 14 days 9/28/22 10/12/22  Molly Paez DO   metFORMIN (GLUCOPHAGE) 500 MG tablet Take 1 tablet by mouth 2 times daily (with meals) for 14 days New directions as of 2021 9/28/22 10/12/22  Molly Paez DO   atenolol (TENORMIN) 25 MG tablet Take 1 tablet by mouth daily 22   Wendy Ortega DO   rosuvastatin (CRESTOR) 10 MG tablet Take 1 tablet by mouth daily 22   Wendy Ortega DO   sildenafil (VIAGRA) 100 MG tablet Take 1 tablet by mouth daily as needed for Erectile Dysfunction 22   Wendy Ortega DO   Ascorbic Acid (VITAMIN C) 500 MG tablet Take 500 mg by mouth daily. Historical Provider, MD   aspirin 325 MG tablet Take 325 mg by mouth daily. Historical Provider, MD       Current medications:    No current facility-administered medications for this encounter.      Current Outpatient Medications   Medication Sig Dispense Refill    zinc gluconate 50 MG tablet Take 50 mg by mouth daily      Cholecalciferol (VITAMIN D3) 1.25 MG (18847 UT) CAPS Take 1 capsule by mouth daily      magnesium 30 MG tablet Take 30 mg by mouth 2 times daily      glipiZIDE (GLUCOTROL XL) 10 MG extended release tablet Take 1 tablet by mouth daily (with breakfast) for 14 days New dose 11/22/2021 14 tablet 1    lisinopril (PRINIVIL;ZESTRIL) 5 MG tablet Take 1 tablet by mouth daily for 14 days 14 tablet 1    metFORMIN (GLUCOPHAGE) 500 MG tablet Take 1 tablet by mouth 2 times daily (with meals) for 14 days New directions as of 11/22/2021 28 tablet 1    atenolol (TENORMIN) 25 MG tablet Take 1 tablet by mouth daily 90 tablet 3    rosuvastatin (CRESTOR) 10 MG tablet Take 1 tablet by mouth daily 90 tablet 3    sildenafil (VIAGRA) 100 MG tablet Take 1 tablet by mouth daily as needed for Erectile Dysfunction 10 tablet 5    Ascorbic Acid (VITAMIN C) 500 MG tablet Take 500 mg by mouth daily.  aspirin 325 MG tablet Take 325 mg by mouth daily.            Allergies:  No Known Allergies    Problem List:    Patient Active Problem List   Diagnosis Code    CAD (coronary artery disease) I25.10    HTN (hypertension) I10    Hyperlipemia E78.5    Class 3 severe obesity due to excess calories with serious comorbidity and body mass index (BMI) of 45.0 to 49.9 in adult ([facility] E66.01, Z68.42    Type 2 diabetes mellitus without complication, without long-term current use of insulin (HCC) E11.9    Gastroesophageal reflux disease without esophagitis K21.9    DAVID (obstructive sleep apnea) G47.33       Past Medical History:        Diagnosis Date    CAD (coronary artery disease)     Diabetes mellitus ([facility]     HTN (hypertension)     Hyperlipemia     MI, old     2 1/2    Prostate CA ([facility]        Past Surgical History:        Procedure Laterality Date    COLONOSCOPY      x3    CORONARY ANGIOPLASTY WITH STENT PLACEMENT      x 1    PROSTATE BIOPSY  2023    TONSILLECTOMY      as child       Social History:    Social History     Tobacco Use    Smoking status: Never  Smokeless tobacco: Never   Substance Use Topics    Alcohol use: No                                Counseling given: Not Answered      Vital Signs (Current):   Vitals:    03/02/23 1256   Weight: 250 lb (113.4 kg)   Height: 5' 7\" (1.702 m)                                              BP Readings from Last 3 Encounters:   07/30/22 (!) 150/70   04/28/22 124/80   11/19/21 130/80       NPO Status:                                                                                 BMI:   Wt Readings from Last 3 Encounters:   07/30/22 249 lb (112.9 kg)   04/28/22 271 lb 6.4 oz (123.1 kg)   11/19/21 263 lb (119.3 kg)     Body mass index is 39.16 kg/m². CBC:   Lab Results   Component Value Date/Time    WBC 6.7 07/12/2022 12:00 PM    RBC 5.14 07/12/2022 12:00 PM    HGB 15.3 07/12/2022 12:00 PM    HCT 48.1 07/12/2022 12:00 PM    MCV 93.6 07/12/2022 12:00 PM    RDW 14.1 07/12/2022 12:00 PM     07/12/2022 12:00 PM       CMP:   Lab Results   Component Value Date/Time     07/12/2022 12:00 PM    K 4.2 07/12/2022 12:00 PM     07/12/2022 12:00 PM    CO2 20 07/12/2022 12:00 PM    BUN 17 07/12/2022 12:00 PM    CREATININE 0.9 07/12/2022 12:00 PM    GFRAA >60 07/12/2022 12:00 PM    LABGLOM >60 07/12/2022 12:00 PM    GLUCOSE 154 07/12/2022 12:00 PM    GLUCOSE 112 12/03/2011 11:19 AM    PROT 6.9 07/12/2022 12:00 PM    CALCIUM 9.5 07/12/2022 12:00 PM    BILITOT 1.4 07/12/2022 12:00 PM    ALKPHOS 69 07/12/2022 12:00 PM    AST 30 07/12/2022 12:00 PM    ALT 28 07/12/2022 12:00 PM       POC Tests: No results for input(s): POCGLU, POCNA, POCK, POCCL, POCBUN, POCHEMO, POCHCT in the last 72 hours.     Coags:   Lab Results   Component Value Date/Time    PROTIME 12.2 06/07/2019 02:00 PM    INR 1.1 06/07/2019 02:00 PM       HCG (If Applicable): No results found for: PREGTESTUR, PREGSERUM, HCG, HCGQUANT     ABGs: No results found for: PHART, PO2ART, YNL1EUG, HEK3EJE, BEART, V8ESHDJN     Type & Screen (If Applicable):  No results found for: Di Wallace    Drug/Infectious Status (If Applicable):  No results found for: HIV, HEPCAB    COVID-19 Screening (If Applicable): No results found for: COVID19        Anesthesia Evaluation  Patient summary reviewed and Nursing notes reviewed no history of anesthetic complications:   Airway: Mallampati: III  TM distance: >3 FB   Neck ROM: full  Mouth opening: > = 3 FB   Dental: normal exam         Pulmonary:   (+) sleep apnea: on noncompliant,  decreased breath sounds: bilateral     (-) not a current smoker                           Cardiovascular:  Exercise tolerance: good (>4 METS),   (+) hypertension: moderate, past MI: no interval change, CAD: obstructive, CABG/stent (PTCA with stent approx. 14 years ago. Denies anginal symptoms or NTG usage.):, hyperlipidemia      ECG reviewed  Rhythm: regular  Rate: normal           Beta Blocker:  Dose within 24 Hrs      ROS comment: EKG:  Sinus  Rhythm   WITHIN NORMAL LIMITS     Neuro/Psych:   Negative Neuro/Psych ROS     (-) seizures and TIA           GI/Hepatic/Renal:   (+) GERD: no interval change, morbid obesity (BMI 39.2 kg/m2)     (-) hepatitis and no renal disease      ROS comment: Erectile dysfunction. Endo/Other:    (+) Diabetes (A1C 6.9% in July)Type II DM, , malignancy/cancer (Prostate CA). (-) blood dyscrasia        Pt had no PAT visit       Abdominal:   (+) obese,           Vascular: negative vascular ROS. - DVT and PE. Other Findings:           Anesthesia Plan      MAC     ASA 3       Induction: intravenous. MIPS: Postoperative opioids intended and Prophylactic antiemetics administered. Anesthetic plan and risks discussed with patient. Plan discussed with JUAN F.                 Shaunna Hassan DO   3/3/2023      Note reviewed and agree with plan ROSA Broussard - CRNA

## 2023-03-06 ENCOUNTER — HOSPITAL ENCOUNTER (OUTPATIENT)
Age: 58
Setting detail: OUTPATIENT SURGERY
Discharge: HOME | End: 2023-03-06
Attending: STUDENT IN AN ORGANIZED HEALTH CARE EDUCATION/TRAINING PROGRAM | Admitting: STUDENT IN AN ORGANIZED HEALTH CARE EDUCATION/TRAINING PROGRAM
Payer: COMMERCIAL

## 2023-03-06 ENCOUNTER — ANESTHESIA (OUTPATIENT)
Dept: OPERATING ROOM | Age: 58
End: 2023-03-06
Payer: COMMERCIAL

## 2023-03-06 VITALS
HEART RATE: 70 BPM | TEMPERATURE: 97 F | DIASTOLIC BLOOD PRESSURE: 81 MMHG | SYSTOLIC BLOOD PRESSURE: 147 MMHG | HEIGHT: 67 IN | BODY MASS INDEX: 39.24 KG/M2 | RESPIRATION RATE: 20 BRPM | WEIGHT: 250 LBS | OXYGEN SATURATION: 98 %

## 2023-03-06 LAB — METER GLUCOSE: 126 MG/DL (ref 74–99)

## 2023-03-06 PROCEDURE — 2709999900 HC NON-CHARGEABLE SUPPLY: Performed by: STUDENT IN AN ORGANIZED HEALTH CARE EDUCATION/TRAINING PROGRAM

## 2023-03-06 PROCEDURE — 7100000010 HC PHASE II RECOVERY - FIRST 15 MIN: Performed by: STUDENT IN AN ORGANIZED HEALTH CARE EDUCATION/TRAINING PROGRAM

## 2023-03-06 PROCEDURE — 3600000012 HC SURGERY LEVEL 2 ADDTL 15MIN: Performed by: STUDENT IN AN ORGANIZED HEALTH CARE EDUCATION/TRAINING PROGRAM

## 2023-03-06 PROCEDURE — 3700000000 HC ANESTHESIA ATTENDED CARE: Performed by: STUDENT IN AN ORGANIZED HEALTH CARE EDUCATION/TRAINING PROGRAM

## 2023-03-06 PROCEDURE — 6360000002 HC RX W HCPCS: Performed by: NURSE ANESTHETIST, CERTIFIED REGISTERED

## 2023-03-06 PROCEDURE — 2580000003 HC RX 258: Performed by: NURSE PRACTITIONER

## 2023-03-06 PROCEDURE — 3700000001 HC ADD 15 MINUTES (ANESTHESIA): Performed by: STUDENT IN AN ORGANIZED HEALTH CARE EDUCATION/TRAINING PROGRAM

## 2023-03-06 PROCEDURE — 82962 GLUCOSE BLOOD TEST: CPT

## 2023-03-06 PROCEDURE — 2500000003 HC RX 250 WO HCPCS: Performed by: NURSE ANESTHETIST, CERTIFIED REGISTERED

## 2023-03-06 PROCEDURE — 3600000002 HC SURGERY LEVEL 2 BASE: Performed by: STUDENT IN AN ORGANIZED HEALTH CARE EDUCATION/TRAINING PROGRAM

## 2023-03-06 PROCEDURE — 6360000002 HC RX W HCPCS: Performed by: NURSE PRACTITIONER

## 2023-03-06 PROCEDURE — 2500000003 HC RX 250 WO HCPCS: Performed by: STUDENT IN AN ORGANIZED HEALTH CARE EDUCATION/TRAINING PROGRAM

## 2023-03-06 PROCEDURE — C1889 IMPLANT/INSERT DEVICE, NOC: HCPCS | Performed by: STUDENT IN AN ORGANIZED HEALTH CARE EDUCATION/TRAINING PROGRAM

## 2023-03-06 PROCEDURE — 2580000003 HC RX 258: Performed by: STUDENT IN AN ORGANIZED HEALTH CARE EDUCATION/TRAINING PROGRAM

## 2023-03-06 PROCEDURE — 7100000011 HC PHASE II RECOVERY - ADDTL 15 MIN: Performed by: STUDENT IN AN ORGANIZED HEALTH CARE EDUCATION/TRAINING PROGRAM

## 2023-03-06 DEVICE — SPACEOAR SYSTEMS
Type: IMPLANTABLE DEVICE | Site: PROSTATE | Status: FUNCTIONAL
Brand: SPACEOAR VUE™ SYSTEM - 10ML

## 2023-03-06 RX ORDER — SODIUM CHLORIDE 0.9 % (FLUSH) 0.9 %
5-40 SYRINGE (ML) INJECTION PRN
Status: CANCELLED | OUTPATIENT
Start: 2023-03-06

## 2023-03-06 RX ORDER — SODIUM CHLORIDE 0.9 % (FLUSH) 0.9 %
5-40 SYRINGE (ML) INJECTION PRN
Status: DISCONTINUED | OUTPATIENT
Start: 2023-03-06 | End: 2023-03-06 | Stop reason: HOSPADM

## 2023-03-06 RX ORDER — DIPHENHYDRAMINE HYDROCHLORIDE 50 MG/ML
12.5 INJECTION INTRAMUSCULAR; INTRAVENOUS
Status: CANCELLED | OUTPATIENT
Start: 2023-03-06 | End: 2023-03-07

## 2023-03-06 RX ORDER — MIDAZOLAM HYDROCHLORIDE 1 MG/ML
INJECTION INTRAMUSCULAR; INTRAVENOUS PRN
Status: DISCONTINUED | OUTPATIENT
Start: 2023-03-06 | End: 2023-03-06 | Stop reason: SDUPTHER

## 2023-03-06 RX ORDER — HYDRALAZINE HYDROCHLORIDE 20 MG/ML
5 INJECTION INTRAMUSCULAR; INTRAVENOUS
Status: CANCELLED | OUTPATIENT
Start: 2023-03-06

## 2023-03-06 RX ORDER — BUPIVACAINE HYDROCHLORIDE 5 MG/ML
INJECTION, SOLUTION EPIDURAL; INTRACAUDAL PRN
Status: DISCONTINUED | OUTPATIENT
Start: 2023-03-06 | End: 2023-03-06 | Stop reason: ALTCHOICE

## 2023-03-06 RX ORDER — LABETALOL HYDROCHLORIDE 5 MG/ML
INJECTION, SOLUTION INTRAVENOUS PRN
Status: DISCONTINUED | OUTPATIENT
Start: 2023-03-06 | End: 2023-03-06 | Stop reason: SDUPTHER

## 2023-03-06 RX ORDER — MEPERIDINE HYDROCHLORIDE 25 MG/ML
12.5 INJECTION INTRAMUSCULAR; INTRAVENOUS; SUBCUTANEOUS ONCE
Status: CANCELLED | OUTPATIENT
Start: 2023-03-06 | End: 2023-03-06

## 2023-03-06 RX ORDER — SODIUM CHLORIDE 9 MG/ML
INJECTION, SOLUTION INTRAVENOUS PRN
Status: DISCONTINUED | OUTPATIENT
Start: 2023-03-06 | End: 2023-03-06 | Stop reason: HOSPADM

## 2023-03-06 RX ORDER — SODIUM CHLORIDE 0.9 % (FLUSH) 0.9 %
5-40 SYRINGE (ML) INJECTION EVERY 12 HOURS SCHEDULED
Status: DISCONTINUED | OUTPATIENT
Start: 2023-03-06 | End: 2023-03-06 | Stop reason: HOSPADM

## 2023-03-06 RX ORDER — CIPROFLOXACIN 500 MG/1
500 TABLET, FILM COATED ORAL 2 TIMES DAILY
Qty: 6 TABLET | Refills: 0 | Status: SHIPPED | OUTPATIENT
Start: 2023-03-06 | End: 2023-03-09

## 2023-03-06 RX ORDER — LABETALOL HYDROCHLORIDE 5 MG/ML
5 INJECTION, SOLUTION INTRAVENOUS
Status: CANCELLED | OUTPATIENT
Start: 2023-03-06

## 2023-03-06 RX ORDER — SODIUM CHLORIDE 0.9 % (FLUSH) 0.9 %
5-40 SYRINGE (ML) INJECTION EVERY 12 HOURS SCHEDULED
Status: CANCELLED | OUTPATIENT
Start: 2023-03-06

## 2023-03-06 RX ORDER — SODIUM CHLORIDE 9 MG/ML
INJECTION, SOLUTION INTRAVENOUS PRN
Status: CANCELLED | OUTPATIENT
Start: 2023-03-06

## 2023-03-06 RX ORDER — PROCHLORPERAZINE EDISYLATE 5 MG/ML
5 INJECTION INTRAMUSCULAR; INTRAVENOUS
Status: CANCELLED | OUTPATIENT
Start: 2023-03-06 | End: 2023-03-07

## 2023-03-06 RX ORDER — FENTANYL CITRATE 50 UG/ML
INJECTION, SOLUTION INTRAMUSCULAR; INTRAVENOUS PRN
Status: DISCONTINUED | OUTPATIENT
Start: 2023-03-06 | End: 2023-03-06 | Stop reason: SDUPTHER

## 2023-03-06 RX ORDER — SODIUM CHLORIDE 9 MG/ML
INJECTION, SOLUTION INTRAVENOUS CONTINUOUS
Status: DISCONTINUED | OUTPATIENT
Start: 2023-03-06 | End: 2023-03-06 | Stop reason: HOSPADM

## 2023-03-06 RX ORDER — SODIUM CHLORIDE 0.9 % (FLUSH) 0.9 %
SYRINGE (ML) INJECTION PRN
Status: DISCONTINUED | OUTPATIENT
Start: 2023-03-06 | End: 2023-03-06 | Stop reason: ALTCHOICE

## 2023-03-06 RX ORDER — PROPOFOL 10 MG/ML
INJECTION, EMULSION INTRAVENOUS PRN
Status: DISCONTINUED | OUTPATIENT
Start: 2023-03-06 | End: 2023-03-06 | Stop reason: SDUPTHER

## 2023-03-06 RX ORDER — FENTANYL CITRATE 50 UG/ML
25 INJECTION, SOLUTION INTRAMUSCULAR; INTRAVENOUS EVERY 5 MIN PRN
Status: CANCELLED | OUTPATIENT
Start: 2023-03-06

## 2023-03-06 RX ADMIN — FENTANYL CITRATE 50 MCG: 50 INJECTION, SOLUTION INTRAMUSCULAR; INTRAVENOUS at 07:34

## 2023-03-06 RX ADMIN — LABETALOL HYDROCHLORIDE 5 MG: 5 INJECTION INTRAVENOUS at 07:44

## 2023-03-06 RX ADMIN — PROPOFOL 80 MCG/KG/MIN: 10 INJECTION, EMULSION INTRAVENOUS at 07:42

## 2023-03-06 RX ADMIN — MIDAZOLAM 2 MG: 1 INJECTION INTRAMUSCULAR; INTRAVENOUS at 07:37

## 2023-03-06 RX ADMIN — PROPOFOL 100 MG: 10 INJECTION, EMULSION INTRAVENOUS at 07:41

## 2023-03-06 RX ADMIN — FENTANYL CITRATE 50 MCG: 50 INJECTION, SOLUTION INTRAMUSCULAR; INTRAVENOUS at 07:46

## 2023-03-06 RX ADMIN — WATER 2000 MG: 1 INJECTION INTRAMUSCULAR; INTRAVENOUS; SUBCUTANEOUS at 07:38

## 2023-03-06 RX ADMIN — SODIUM CHLORIDE: 9 INJECTION, SOLUTION INTRAVENOUS at 07:22

## 2023-03-06 ASSESSMENT — PAIN SCALES - GENERAL: PAINLEVEL_OUTOF10: 0

## 2023-03-06 ASSESSMENT — PAIN - FUNCTIONAL ASSESSMENT: PAIN_FUNCTIONAL_ASSESSMENT: 0-10

## 2023-03-06 NOTE — ANESTHESIA POSTPROCEDURE EVALUATION
Department of Anesthesiology  Postprocedure Note    Patient: America Ozuna  MRN: [id number]  YOB: 1965  Date of evaluation: 3/6/2023      Procedure Summary     Date: 03/06/23 Room / Location: [facility] OR 05 / [facility]    Anesthesia Start: 5821 Anesthesia Stop:     Procedure: ULTRASOUND GUIDED TRANSPERINEAL IMPLANT OF SPACEOAR Diagnosis:       Prostate CA ([address]      (Prostate CA ([address] Nadia Ramos)    Surgeons: Aaron Christianson MD Responsible Provider: Sunil Quiroz DO    Anesthesia Type: MAC ASA Status: 3          Anesthesia Type: No value filed. Dilip Phase I: Dilip Score: 10    Dilip Phase II:        Anesthesia Post Evaluation    Patient location during evaluation: PACU  Patient participation: complete - patient participated  Level of consciousness: awake and alert  Pain score: 0  Airway patency: patent  Nausea & Vomiting: no nausea and no vomiting  Complications: no  Cardiovascular status: blood pressure returned to baseline  Respiratory status: acceptable  Hydration status: euvolemic  Comments: Seen and examined. Progressing as expected. No questions or concerns at this time.

## 2023-03-06 NOTE — H&P
Elizabeth Stinson is a 62 y.o. male with prostate cancer. Here for space oar. No new changes. Please see paper h and p for full details. Past Medical History:   Diagnosis Date    CAD (coronary artery disease)     Diabetes mellitus ([facility]     HTN (hypertension)     Hyperlipemia     MI, old     2 1/2    Prostate CA ([facility]        Past Surgical History:   Procedure Laterality Date    COLONOSCOPY      x3    CORONARY ANGIOPLASTY WITH STENT PLACEMENT      x 1    PROSTATE BIOPSY  2023    TONSILLECTOMY      as child       Family History   Problem Relation Age of Onset    Cancer Mother     Cancer Father        Prior to Admission medications    Medication Sig Start Date End Date Taking? Authorizing Provider   zinc gluconate 50 MG tablet Take 50 mg by mouth daily   Yes Historical Provider, MD   Cholecalciferol (VITAMIN D3) 1.25 MG (49117 UT) CAPS Take 1 capsule by mouth daily   Yes Historical Provider, MD   magnesium 30 MG tablet Take 30 mg by mouth 2 times daily   Yes Historical Provider, MD   glipiZIDE (GLUCOTROL XL) 10 MG extended release tablet Take 1 tablet by mouth daily (with breakfast) for 14 days New dose 11/22/2021 9/28/22 3/6/23  Molly Paez,    lisinopril (PRINIVIL;ZESTRIL) 5 MG tablet Take 1 tablet by mouth daily for 14 days 9/28/22 10/12/22  Molly Paez DO   metFORMIN (GLUCOPHAGE) 500 MG tablet Take 1 tablet by mouth 2 times daily (with meals) for 14 days New directions as of 11/22/2021 9/28/22 3/6/23  Molly Paez DO   atenolol (TENORMIN) 25 MG tablet Take 1 tablet by mouth daily 4/28/22 Mayra Dine, DO   rosuvastatin (CRESTOR) 10 MG tablet Take 1 tablet by mouth daily 4/28/22 Mayra Dine, DO   sildenafil (VIAGRA) 100 MG tablet Take 1 tablet by mouth daily as needed for Erectile Dysfunction 4/28/22 Mayra Dine, DO   Ascorbic Acid (VITAMIN C) 500 MG tablet Take 500 mg by mouth daily. Historical Provider, MD   aspirin 325 MG tablet Take 325 mg by mouth daily.       Historical Provider, MD        Allergies: Patient has no known allergies. Social History     Tobacco Use    Smoking status: Never    Smokeless tobacco: Never   Substance Use Topics    Alcohol use: No        Review of Systems:  Respiratory: negative for cough and hemoptysis  Cardiovascular: negative for chest pain and dyspnea  Gastrointestinal: negative for abdominal pain, diarrhea, nausea and vomiting  Genitourinary: as per HPI, otherwise negative. Derm: negative for rash and skin lesion(s)  Neurological: negative for seizures and tremors  Endocrine: negative for diabetic symptoms including polydipsia and polyuria  All other systems negative    Physical Exam:  Vitals:    03/06/23 0628   BP: (!) 146/79   Pulse: 63   Resp: 18   Temp: 97 °F (36.1 °C)   SpO2: 96%      Skin:  Warm and dry. No rash or bruises  HEENT:  PERRLA, EOMI  Neck:  No JVD, No thyromegaly  Cardiac:  RRR  Lungs:  No audible wheezes, symmetric respirations, non-labored  Abdomen: Soft, non-tender, non-distended  Extremities:  No clubbing, edema or cyanosis  Neurological:  Moves all extremities, normal DTR    Lab Results   Component Value Date    WBC 6.7 07/12/2022    HGB 15.3 07/12/2022    HCT 48.1 07/12/2022    MCV 93.6 07/12/2022     07/12/2022       Lab Results   Component Value Date    CREATININE 0.9 07/12/2022       Lab Results   Component Value Date    PSA 5.73 (H) 07/12/2022    PSA 4.2 (H) 04/14/2021    PSA 4.35 (H) 08/25/2020       No results for input(s): LABURIN in the last 72 hours. No results for input(s): BC in the last 72 hours. No results for input(s): Srini Cicero in the last 72 hours. Assessment and Plan:  1.  Space oar

## 2023-03-06 NOTE — OP NOTE
Operative Note      Patient: Shilpa Lovell  YOB: 1965  MRN: [id number]    Date of Procedure: 3/6/2023    Pre-Op Diagnosis: Prostate CA ([address] Laverta Copper    Post-Op Diagnosis: Same       Procedure(s):  ULTRASOUND GUIDED TRANSPERINEAL IMPLANT OF SPACEOAR    Surgeon(s):  Estella Sher MD    Assistant:   * No surgical staff found *    Anesthesia: Monitor Anesthesia Care    Estimated Blood Loss (mL): Minimal    Complications: None    Specimens:   * No specimens in log *    Implants:  Implant Name Type Inv. Item Serial No.  Lot No. LRB No. Used Action   SPACER RAD THER SPACEOAR GUERA - [id number]  SPACER RAD THER SPACEOAR GUERA  [employer]- [id number] N/A 1 Implanted         Drains: * No LDAs found *    Findings: see op note    Detailed Description of Procedure:       Patient was wheeled back into the operative suite. Upon entering the operative suite the patient was identified using name band and number. The patient was transferred the operative table and placed in supine position. Anesthesia was induced without any complications a surgical timeout was taken and all in the room in agreement the patient was then placed in the dorsolithotomy position. Patient's perineum was then prepped. Prior to needle insertion and axial measurement of the space between the prostate mid gland and rectum was noted. SpaceOAR hydrogel was prepared as described in the 's instructions for use while the patient was prepped. With the subject maintained in the dorsolithotomy position the transrectal ultrasound probe was positioned to enable visual guidance of the needle into the space between the prostate and the rectum. Under transrectal ultrasound guidance, the 15 cm 18-gauge needle was inserted through the rectourethralis muscle and the needle tip advanced to the perirectal fat inferior to the prostate all by using a transperineal approach with a side fire transrectal ultrasound guidance.   The needle position was confirmed in both the sagittal and axial fields. Saline was used to dissect the space between the did not BAs fascia and the anterior rectal wall. A space was created with with Hydro dissection    With the needle tip at the mid gland the axial field was viewed to confirm the needle was not in the rectal wall. While maintaining the desired position aspiration was done to ensure that the needle was not in the vascular space. The assembled SpaceOAR delivery system was then attached to the 18-gauge needle. Under ultrasound guidance, a smooth, continuous injection technique was used to dispense the SpaceOAR hydrogel into the space between the prostate and the rectum. The entire syringe contents 10 mm total were injected without stopping. Optimal visualization of the needle during hydrogel administration was maintained at all times. An actual measurement of the space between the prostate and the rectum immediately post injection was noted. No suspected penetration or compromise of the rectal wall occurred. Plan:    Patient is now ready to undergo his external beam radiation.   The patient will be placed on ciprofloxacin 500 mg twice a day for 3 days and follow-up with radiation oncology for further treatment      Electronically signed by Kathi Blackwood MD on 3/6/2023 at 7:56 AM

## 2023-03-06 NOTE — DISCHARGE INSTRUCTIONS
You are now ready for radiation. Call the radiation center to let them know the space oar has been placed. Tylenol for pain. Antibiotics for 3 days.

## 2023-03-15 ENCOUNTER — HOSPITAL ENCOUNTER (OUTPATIENT)
Age: 58
Discharge: HOME | End: 2023-03-15
Payer: COMMERCIAL

## 2023-03-15 ENCOUNTER — HOSPITAL ENCOUNTER (OUTPATIENT)
Dept: CT IMAGING | Age: 58
Discharge: HOME | End: 2023-03-17
Payer: COMMERCIAL

## 2023-03-15 DIAGNOSIS — J90 EXUDATIVE PLEURISY: ICD-10-CM

## 2023-03-15 LAB
ANION GAP SERPL CALCULATED.3IONS-SCNC: 11 MMOL/L (ref 7–16)
BUN SERPL-MCNC: 17 MG/DL (ref 6–20)
CALCIUM SERPL-MCNC: 9.6 MG/DL (ref 8.6–10.2)
CHLORIDE SERPL-SCNC: 103 MMOL/L (ref 98–107)
CO2 SERPL-SCNC: 26 MMOL/L (ref 22–29)
CREAT SERPL-MCNC: 0.9 MG/DL (ref 0.7–1.2)
GLUCOSE SERPL-MCNC: 77 MG/DL (ref 74–99)
POTASSIUM SERPL-SCNC: 4.4 MMOL/L (ref 3.5–5)
SODIUM SERPL-SCNC: 140 MMOL/L (ref 132–146)

## 2023-03-15 PROCEDURE — 80048 BASIC METABOLIC PNL TOTAL CA: CPT

## 2023-03-15 PROCEDURE — 71260 CT THORAX DX C+: CPT

## 2023-03-15 PROCEDURE — 36415 COLL VENOUS BLD VENIPUNCTURE: CPT

## 2023-03-15 PROCEDURE — 6360000004 HC RX CONTRAST MEDICATION: Performed by: RADIOLOGY

## 2023-03-15 RX ADMIN — IOPAMIDOL 75 ML: 755 INJECTION, SOLUTION INTRAVENOUS at 16:41

## 2023-04-05 RX ORDER — ROSUVASTATIN CALCIUM 10 MG/1
10 TABLET, COATED ORAL DAILY
Qty: 90 TABLET | Refills: 0 | Status: SHIPPED | OUTPATIENT
Start: 2023-04-05

## 2023-04-05 RX ORDER — ATENOLOL 25 MG/1
25 TABLET ORAL DAILY
Qty: 90 TABLET | Refills: 0 | Status: SHIPPED | OUTPATIENT
Start: 2023-04-05

## 2023-05-25 ENCOUNTER — TELEPHONE (OUTPATIENT)
Dept: CARDIOLOGY | Age: 58
End: 2023-05-25

## 2023-05-25 NOTE — TELEPHONE ENCOUNTER
Spoke with patient and confirmed Nuclear stress test appointment on 5/30/2023 at 0730. Instructions for test,hold diabetic medications the morning of the test ,hold atenolol 24 hours prior to the test , and COVID-19 preprocedure information reviewed with patient, questions answered. Patient verbalized understanding.

## 2023-05-30 ENCOUNTER — HOSPITAL ENCOUNTER (OUTPATIENT)
Dept: CARDIOLOGY | Age: 58
Discharge: HOME | End: 2023-05-30
Payer: COMMERCIAL

## 2023-05-30 VITALS
HEIGHT: 67 IN | SYSTOLIC BLOOD PRESSURE: 120 MMHG | DIASTOLIC BLOOD PRESSURE: 78 MMHG | HEART RATE: 72 BPM | BODY MASS INDEX: 40.81 KG/M2 | WEIGHT: 260 LBS | RESPIRATION RATE: 12 BRPM

## 2023-05-30 DIAGNOSIS — I25.10 CORONARY ARTERY DISEASE INVOLVING NATIVE HEART, UNSPECIFIED VESSEL OR LESION TYPE, UNSPECIFIED WHETHER ANGINA PRESENT: Primary | ICD-10-CM

## 2023-05-30 LAB
LV EF: 73 %
LVEF MODALITY: NORMAL

## 2023-05-30 PROCEDURE — 93017 CV STRESS TEST TRACING ONLY: CPT

## 2023-05-30 PROCEDURE — 78452 HT MUSCLE IMAGE SPECT MULT: CPT

## 2023-05-30 PROCEDURE — 2580000003 HC RX 258: Performed by: INTERNAL MEDICINE

## 2023-05-30 PROCEDURE — A9500 TC99M SESTAMIBI: HCPCS | Performed by: INTERNAL MEDICINE

## 2023-05-30 PROCEDURE — 3430000000 HC RX DIAGNOSTIC RADIOPHARMACEUTICAL: Performed by: INTERNAL MEDICINE

## 2023-05-30 RX ORDER — SODIUM CHLORIDE 0.9 % (FLUSH) 0.9 %
10 SYRINGE (ML) INJECTION PRN
Status: DISCONTINUED | OUTPATIENT
Start: 2023-05-30 | End: 2023-05-31 | Stop reason: HOSPADM

## 2023-05-30 RX ORDER — TETRAKIS(2-METHOXYISOBUTYLISOCYANIDE)COPPER(I) TETRAFLUOROBORATE 1 MG/ML
34.2 INJECTION, POWDER, LYOPHILIZED, FOR SOLUTION INTRAVENOUS
Status: COMPLETED | OUTPATIENT
Start: 2023-05-30 | End: 2023-05-30

## 2023-05-30 RX ORDER — GLIPIZIDE 10 MG/1
10 TABLET ORAL DAILY
COMMUNITY
Start: 2023-04-11 | End: 2023-05-30

## 2023-05-30 RX ORDER — MAGNESIUM OXIDE 400 MG/1
400 TABLET ORAL DAILY
COMMUNITY

## 2023-05-30 RX ORDER — TETRAKIS(2-METHOXYISOBUTYLISOCYANIDE)COPPER(I) TETRAFLUOROBORATE 1 MG/ML
9.8 INJECTION, POWDER, LYOPHILIZED, FOR SOLUTION INTRAVENOUS
Status: COMPLETED | OUTPATIENT
Start: 2023-05-30 | End: 2023-05-30

## 2023-05-30 RX ADMIN — SODIUM CHLORIDE, PRESERVATIVE FREE 10 ML: 5 INJECTION INTRAVENOUS at 09:16

## 2023-05-30 RX ADMIN — Medication 34.2 MILLICURIE: at 09:16

## 2023-05-30 RX ADMIN — Medication 9.8 MILLICURIE: at 07:40

## 2023-05-30 RX ADMIN — SODIUM CHLORIDE, PRESERVATIVE FREE 10 ML: 5 INJECTION INTRAVENOUS at 07:40

## 2023-05-30 NOTE — PROCEDURES
[address],[facility]  [phone]                Exercise Stress Nuclear Gated SPECT Study    Name: [address] Account Number: [de-identified]    :  1965      Sex: male              Date of Study:  2023    Height: 5' 7\" (170.2 cm)  Weight - Scale: 260 lb (117.9 kg)     Ordering Provider: Tristan Alonso DO          PCP: Tristan Alonso DO      Cardiologist: Danna Pascal DO                        Interpreting Physician: Angela Cristina MD  _________________________________________________________________________________    Indication:   Evaluation of extent and severity of coronary artery disease    Clinical History:   Patient has prior history of coronary artery disease. Resting ECG:    NSR 73 bpm. Normal axis/intervals. No ST/T changes. Exercise: The patient exercised using a García protocol, completing 6:16 minutes and reaching an estimated work load of 8.81 metabolic equivalents (METS). Resting HR was 73. Peak exercise heart rate was 151 ( 92% of maximum predicted heart rate for age). Baseline /78. Peak exercise /80. The blood pressure response to exercise was normal      Exercise was terminated due to dyspnea and bilateral knee pain. The patient experienced no chest pain with exercise. Exercise ECG:   The patient demonstrated no arrhythmias during exercise. Transient aberrant conduction and occasional PVC in recovery. With exercise, there were no ST segment changes of significance at the heart rate achieved. Javier treadmill score was 6.25 implying low risk. IMAGING: Myocardial perfusion imaging was performed at rest 30-35 minutes following the intravenous injection of 9.8 mCi of (Tc-Sestamibi) followed by 10 ml of Normal Saline.   At peak exercise, the patient was injected intravenously with 34.2 mCi of (Tc-Sestamibi) followed by 10 ml of Normal

## 2023-05-30 NOTE — DISCHARGE INSTRUCTIONS
[address]      Instructions to Patients    The following are the instructions for patients who have had a procedure in our office today. Patient name: Laine Given    Radionuclide Activity: 40mCi of 99mTc-Sestamibi    Date Administered: 5/30/2023    Expires: 48 hours after scheduled appointment time      Patient may resume normal activity unless otherwise instructed. Patient may resume medications as normal.  If the need should arise, patient may call [phone] between the hours of 8:00am-5:00pm.  After hours there is at least one physician on-call at all times for those patients needing assistance. Patients may call [phone] and the answering service will direct the patient to one of our physicians for assistance. After the patient's test if they are going to be leaving from an airport in the near future they should take this letter with them to verify the test and radionuclide used for their test.      This letter verifies that the above named bearer received an injection of a radionuclide for medical purpose/usage only.         Electronically signed by Yakov Peña on 5/30/2023 at 8:41 AM

## 2023-08-16 PROBLEM — C61 PROSTATE CANCER (HCC): Status: ACTIVE | Noted: 2022-12-15

## 2023-08-17 ENCOUNTER — TELEPHONE (OUTPATIENT)
Dept: CASE MANAGEMENT | Age: 58
End: 2023-08-17

## 2023-08-17 NOTE — TELEPHONE ENCOUNTER
Met with patient during his follow up appointment with Dr Gayla Singletary today. Patient completed his active treatment May 2023 for his Prostate Cancer. Patient appears in good spirits. States he is doing well since finishing his Radiation treatments. He denies any needs at this time. Provided support encouragement. Instructed patient in detail on his Cancer Treatment Summary and Survivorship Care Plan. Copies sent to patient's PCP and Urologist. Provided written copies of Treatment Summary/Survivorship Care plan, Patient Resource: Cancer Survivorship: A Guide for Patients and their Families booklet, [employer] Survivorship: Late Effects After Radiation for Prostate Cancer, [employer] Healthy Eating after Cancer Treatments and [facility] [employer] handout. Patient will continue to follow with Urology. Encouraged to call with any questions, concerns or needs. Verbally agreed. Patient appreciative of visit and information.

## 2023-08-23 ENCOUNTER — OFFICE VISIT (OUTPATIENT)
Dept: CARDIOLOGY CLINIC | Age: 58
End: 2023-08-23
Payer: COMMERCIAL

## 2023-08-23 VITALS
SYSTOLIC BLOOD PRESSURE: 118 MMHG | RESPIRATION RATE: 18 BRPM | DIASTOLIC BLOOD PRESSURE: 62 MMHG | HEART RATE: 68 BPM | HEIGHT: 67 IN | WEIGHT: 266.7 LBS | BODY MASS INDEX: 41.86 KG/M2

## 2023-08-23 DIAGNOSIS — I10 HYPERTENSION, UNSPECIFIED TYPE: ICD-10-CM

## 2023-08-23 DIAGNOSIS — I25.10 CORONARY ARTERY DISEASE, UNSPECIFIED VESSEL OR LESION TYPE, UNSPECIFIED WHETHER ANGINA PRESENT, UNSPECIFIED WHETHER NATIVE OR TRANSPLANTED HEART: Primary | ICD-10-CM

## 2023-08-23 PROCEDURE — 3078F DIAST BP <80 MM HG: CPT | Performed by: INTERNAL MEDICINE

## 2023-08-23 PROCEDURE — 3074F SYST BP LT 130 MM HG: CPT | Performed by: INTERNAL MEDICINE

## 2023-08-23 PROCEDURE — 99213 OFFICE O/P EST LOW 20 MIN: CPT | Performed by: INTERNAL MEDICINE

## 2023-08-23 PROCEDURE — 93000 ELECTROCARDIOGRAM COMPLETE: CPT | Performed by: INTERNAL MEDICINE

## 2023-08-23 RX ORDER — LISINOPRIL 5 MG/1
5 TABLET ORAL DAILY
Qty: 90 TABLET | Refills: 3 | Status: SHIPPED | OUTPATIENT
Start: 2023-08-23 | End: 2024-08-17

## 2023-08-23 RX ORDER — ROSUVASTATIN CALCIUM 10 MG/1
10 TABLET, COATED ORAL DAILY
Qty: 90 TABLET | Refills: 3 | Status: SHIPPED | OUTPATIENT
Start: 2023-08-23

## 2023-08-23 RX ORDER — ATENOLOL 25 MG/1
25 TABLET ORAL DAILY
Qty: 90 TABLET | Refills: 3 | Status: SHIPPED | OUTPATIENT
Start: 2023-08-23

## 2023-08-23 RX ORDER — DULAGLUTIDE 1.5 MG/.5ML
INJECTION, SOLUTION SUBCUTANEOUS
COMMUNITY
Start: 2023-08-06

## 2023-08-23 NOTE — PROGRESS NOTES
TEDDY ALVARADO   OFFICE VISIT  : 65    PROBLEM LIST:  Acute non-ST segment elevation myocardial infarction, 2/15/09. Heart catheterization, 09:  Near normal LV systolic function, minimal inferobasal hypokinesis, EF 55%. 65-70% mid LAD, 95% critical proximal right coronary artery lesion. Status post balloon angioplasty and placement of 3.0 x 20 mm long Liberte drug-eluting stent. Atypical chest discomfort, 3/8/09. Negative cardiac enzymes. Discharged home for outpatient stress test.  Outpatient exercise stress test, 3/9/09:  Fair exercise tolerance with no symptoms or EKG changes of ischemia. Normal perfusion with resting EF 51% increasing to 85% with no wall motion abnormalities. CC: CAD    HPI: The patient presents today for follow up of his CAD status post stent in  with residual LAD disease. Stress normal . No CP or SOB. BP okay. Treated for prostate CA since last visit.      Past Medical History:   Diagnosis Date    CAD (coronary artery disease)     Diabetes mellitus ([facility])     HTN (hypertension)     Hyperlipemia     MI, old     2 1/2    Prostate CA ([address])     Prostate cancer ([address]) 12/15/2022     No Known Allergies    Current Outpatient Medications   Medication Sig Dispense Refill    TRULICITY 1.5 LQ/2.1PR SC injection ADMINISTER 1.5 MG UNDER THE SKIN EVERY WEEK      atenolol (TENORMIN) 25 MG tablet Take 1 tablet by mouth daily 90 tablet 3    lisinopril (PRINIVIL;ZESTRIL) 5 MG tablet Take 1 tablet by mouth daily 90 tablet 3    rosuvastatin (CRESTOR) 10 MG tablet Take 1 tablet by mouth daily 90 tablet 3    magnesium oxide (MAG-OX) 400 MG tablet Take 1 tablet by mouth daily      VITAMIN D PO Take 1 tablet by mouth daily      Glucosamine HCl (GLUCOSAMINE PO) Take 1 tablet by mouth daily      zinc gluconate 50 MG tablet Take 1 tablet by mouth daily      glipiZIDE (GLUCOTROL XL) 10 MG extended release tablet Take 1 tablet by mouth daily (with breakfast) for 14 days New dose

## 2023-08-24 RX ORDER — ATENOLOL 25 MG/1
25 TABLET ORAL DAILY
Qty: 90 TABLET | Refills: 3 | OUTPATIENT
Start: 2023-08-24

## 2023-08-24 RX ORDER — ROSUVASTATIN CALCIUM 10 MG/1
10 TABLET, COATED ORAL DAILY
Qty: 90 TABLET | Refills: 3 | OUTPATIENT
Start: 2023-08-24

## 2024-01-23 ENCOUNTER — HOSPITAL ENCOUNTER (OUTPATIENT)
Dept: MRI IMAGING | Age: 59
Discharge: HOME | End: 2024-01-25
Attending: INTERNAL MEDICINE
Payer: COMMERCIAL

## 2024-01-23 DIAGNOSIS — M43.10 SPONDYLOLISTHESIS, UNSPECIFIED SPINAL REGION: ICD-10-CM

## 2024-01-23 DIAGNOSIS — M48.00 SPINAL STENOSIS, UNSPECIFIED SPINAL REGION: ICD-10-CM

## 2024-01-23 PROCEDURE — 72148 MRI LUMBAR SPINE W/O DYE: CPT

## 2024-06-06 RX ORDER — ATENOLOL 25 MG/1
25 TABLET ORAL DAILY
Qty: 90 TABLET | Refills: 3 | Status: SHIPPED | OUTPATIENT
Start: 2024-06-06

## 2024-07-16 DIAGNOSIS — M48.062 LUMBAR STENOSIS WITH NEUROGENIC CLAUDICATION: Primary | ICD-10-CM

## 2024-08-14 ENCOUNTER — OFFICE VISIT (OUTPATIENT)
Dept: PAIN MANAGEMENT | Age: 59
End: 2024-08-14
Payer: COMMERCIAL

## 2024-08-14 ENCOUNTER — PREP FOR PROCEDURE (OUTPATIENT)
Dept: PAIN MANAGEMENT | Age: 59
End: 2024-08-14

## 2024-08-14 VITALS
TEMPERATURE: 97.3 F | RESPIRATION RATE: 16 BRPM | OXYGEN SATURATION: 96 % | WEIGHT: 266 LBS | DIASTOLIC BLOOD PRESSURE: 75 MMHG | HEIGHT: 67 IN | BODY MASS INDEX: 41.75 KG/M2 | SYSTOLIC BLOOD PRESSURE: 121 MMHG | HEART RATE: 67 BPM

## 2024-08-14 DIAGNOSIS — M54.42 CHRONIC BILATERAL LOW BACK PAIN WITH BILATERAL SCIATICA: ICD-10-CM

## 2024-08-14 DIAGNOSIS — M54.16 LUMBAR RADICULOPATHY: ICD-10-CM

## 2024-08-14 DIAGNOSIS — M54.16 LUMBAR RADICULOPATHY: Primary | ICD-10-CM

## 2024-08-14 DIAGNOSIS — M54.41 CHRONIC BILATERAL LOW BACK PAIN WITH BILATERAL SCIATICA: ICD-10-CM

## 2024-08-14 DIAGNOSIS — M48.062 SPINAL STENOSIS OF LUMBAR REGION WITH NEUROGENIC CLAUDICATION: ICD-10-CM

## 2024-08-14 DIAGNOSIS — G89.4 CHRONIC PAIN SYNDROME: Primary | ICD-10-CM

## 2024-08-14 DIAGNOSIS — G89.29 CHRONIC BILATERAL LOW BACK PAIN WITH BILATERAL SCIATICA: ICD-10-CM

## 2024-08-14 DIAGNOSIS — M51.9 LUMBAR DISC DISORDER: ICD-10-CM

## 2024-08-14 PROCEDURE — 3078F DIAST BP <80 MM HG: CPT | Performed by: PAIN MEDICINE

## 2024-08-14 PROCEDURE — 3074F SYST BP LT 130 MM HG: CPT | Performed by: PAIN MEDICINE

## 2024-08-14 PROCEDURE — 99205 OFFICE O/P NEW HI 60 MIN: CPT | Performed by: PAIN MEDICINE

## 2024-08-14 PROCEDURE — 99205 OFFICE O/P NEW HI 60 MIN: CPT

## 2024-08-14 NOTE — PROGRESS NOTES
Juan Ramon Justin presents to the [facility] on 8/14/2024. Juan Ramon is complaining of pain in his low back. The pain is constant. The pain is described as aching, shooting, dull, and sharp. Pain is rated on his best day at a 6, on his worst day at a 10, and on average at a 7 on the VAS scale.       Pacemaker or defibrillator: No     He is not on NSAIDS and is  on anticoagulation medications to include ASA and is managed by Pollo Hollins DO  .     Medication Contract and Consent for Opioid Use Documents Filed        No documents found                    /75   Pulse 67   Temp 97.3 °F (36.3 °C) (Infrared)   Resp 16   Ht 1.702 m (5' 7\")   Wt 120.7 kg (266 lb)   SpO2 96%   BMI 41.66 kg/m²      No LMP for male patient.

## 2024-08-14 NOTE — PROGRESS NOTES
[facility]        [address] [address]  Dept: [phone]        Patient:  Juan Ramon Justin,  1965    Date of Service:  24     Requesting Physician:  Catherine Nelson PA-C    Reason for Consult:      Patient presents with complaints of bilateral, lower back pain that started >2 years ago    HISTORY OF PRESENT ILLNESS:      Pain is constant and is described as aching, dull, sharp, and shooting.     Pain does radiate to both lower extremities. He  has numbness, tingling of the both lower extremities and does not have bladder or bowel dysfunction.    Alleviating factors include: rest.  Aggravating factors include:  walking, standing.     He has been on anticoagulation medications to include ASA and has not been on herbal supplements.  He is diabetic.    Imagin/2024 lumbar MRI w/o -  FINDINGS:  BONES/ALIGNMENT: Prominent hemangioma in the L3 vertebral body.  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  Trace grade 1  anterolisthesis of L4 on L5.  Diffuse disc desiccation.     SPINAL CORD: The conus terminates normally.     SOFT TISSUES: No paraspinal mass identified.     T12-L1: Loss of disc space height.  Early facet hypertrophy.  Mild right  foraminal stenosis.     L1-L2: Bilateral facet hypertrophy.  Near complete effacement of the CSF  space.  Moderate spinal canal stenosis.     L2-L3: Bilateral facet and ligamentum flavum hypertrophy with small disc  bulge.  Complete effacement of the CSF space with severe spinal canal  stenosis.  Severe bilateral subarticular zone stenosis.  Mild redundancy of  the nerve roots of the cauda equina above this level.  Moderate left greater  than right foraminal stenosis.     L3-L4: Bilateral facet and ligamentum flavum hypertrophy.  Minimal disc  bulge.  Moderate spinal canal stenosis.  Moderate bilateral foraminal  stenosis.  Moderate left greater than right subarticular zone stenosis.

## 2024-08-27 ENCOUNTER — TELEPHONE (OUTPATIENT)
Dept: PAIN MANAGEMENT | Age: 59
End: 2024-08-27

## 2024-08-27 DIAGNOSIS — M54.16 LUMBAR RADICULOPATHY: Primary | ICD-10-CM

## 2024-08-27 NOTE — TELEPHONE ENCOUNTER
Call to Juan Ramon A Patrone that procedure was scheduled for 9/3/2024 and that [facility] should call him a few days before for the pre op call and between 2:00 PM and 4:00 PM  the business day before with the arrival time. Instructed Juan Ramon to hold ibuprofen for 24 hours, Celebrex, Mobic, and naprosyn for 4 days and any aspirin containing products, CoQ 10, or fish oil for 7 days. Instructed to call office back if any questions. Juan Ramon verbalized understanding.    Electronically signed by Christopher Maynard RN on 8/27/2024 at 12:00 PM

## 2024-08-30 RX ORDER — ROSUVASTATIN CALCIUM 10 MG/1
10 TABLET, COATED ORAL DAILY
Qty: 90 TABLET | Refills: 3 | Status: SHIPPED | OUTPATIENT
Start: 2024-08-30

## 2024-09-03 ENCOUNTER — HOSPITAL ENCOUNTER (OUTPATIENT)
Age: 59
Setting detail: OUTPATIENT SURGERY
Discharge: HOME | End: 2024-09-03
Attending: PAIN MEDICINE | Admitting: PAIN MEDICINE
Payer: COMMERCIAL

## 2024-09-03 ENCOUNTER — HOSPITAL ENCOUNTER (OUTPATIENT)
Dept: GENERAL RADIOLOGY | Age: 59
Setting detail: OUTPATIENT SURGERY
Discharge: HOME | End: 2024-09-05
Attending: PAIN MEDICINE
Payer: COMMERCIAL

## 2024-09-03 VITALS
OXYGEN SATURATION: 95 % | WEIGHT: 248 LBS | SYSTOLIC BLOOD PRESSURE: 140 MMHG | DIASTOLIC BLOOD PRESSURE: 74 MMHG | HEART RATE: 72 BPM | RESPIRATION RATE: 18 BRPM | BODY MASS INDEX: 38.92 KG/M2 | HEIGHT: 67 IN | TEMPERATURE: 96.8 F

## 2024-09-03 DIAGNOSIS — R52 PAIN MANAGEMENT: ICD-10-CM

## 2024-09-03 LAB — GLUCOSE BLD-MCNC: 97 MG/DL (ref 74–99)

## 2024-09-03 PROCEDURE — 2500000003 HC RX 250 WO HCPCS: Performed by: PAIN MEDICINE

## 2024-09-03 PROCEDURE — 2709999900 HC NON-CHARGEABLE SUPPLY: Performed by: PAIN MEDICINE

## 2024-09-03 PROCEDURE — 7100000010 HC PHASE II RECOVERY - FIRST 15 MIN: Performed by: PAIN MEDICINE

## 2024-09-03 PROCEDURE — 6360000004 HC RX CONTRAST MEDICATION: Performed by: PAIN MEDICINE

## 2024-09-03 PROCEDURE — 7100000011 HC PHASE II RECOVERY - ADDTL 15 MIN: Performed by: PAIN MEDICINE

## 2024-09-03 PROCEDURE — 82962 GLUCOSE BLOOD TEST: CPT

## 2024-09-03 PROCEDURE — 6360000002 HC RX W HCPCS: Performed by: PAIN MEDICINE

## 2024-09-03 PROCEDURE — 62323 NJX INTERLAMINAR LMBR/SAC: CPT | Performed by: PAIN MEDICINE

## 2024-09-03 PROCEDURE — 3600000002 HC SURGERY LEVEL 2 BASE: Performed by: PAIN MEDICINE

## 2024-09-03 RX ORDER — METHYLPREDNISOLONE ACETATE 40 MG/ML
INJECTION, SUSPENSION INTRA-ARTICULAR; INTRALESIONAL; INTRAMUSCULAR; SOFT TISSUE PRN
Status: DISCONTINUED | OUTPATIENT
Start: 2024-09-03 | End: 2024-09-03 | Stop reason: ALTCHOICE

## 2024-09-03 RX ORDER — IOPAMIDOL 612 MG/ML
INJECTION, SOLUTION INTRATHECAL PRN
Status: DISCONTINUED | OUTPATIENT
Start: 2024-09-03 | End: 2024-09-03 | Stop reason: ALTCHOICE

## 2024-09-03 RX ORDER — LIDOCAINE HYDROCHLORIDE 5 MG/ML
INJECTION, SOLUTION INFILTRATION; INTRAVENOUS PRN
Status: DISCONTINUED | OUTPATIENT
Start: 2024-09-03 | End: 2024-09-03 | Stop reason: ALTCHOICE

## 2024-09-03 ASSESSMENT — PAIN DESCRIPTION - DESCRIPTORS: DESCRIPTORS: ACHING

## 2024-09-03 ASSESSMENT — PAIN - FUNCTIONAL ASSESSMENT: PAIN_FUNCTIONAL_ASSESSMENT: 0-10

## 2024-09-03 NOTE — H&P
[facility]        [address] [place] [address]  Dept: [phone]    Procedure History & Physical      Juan Ramon Justin     HPI:    Patient  is here for LBP BLE pain for L1-2 JOSEPH  Labs/imaging studies reviewed   All question and concerns addressed including R/B/A associated with the procedure    Past Medical History:   Diagnosis Date    CAD (coronary artery disease)     Diabetes mellitus (HCC)     HTN (hypertension)     Hyperlipemia     MI, old     2 1/2    Prostate cancer (HCC) 12/15/2022       Past Surgical History:   Procedure Laterality Date    COLONOSCOPY      x3    CORONARY ANGIOPLASTY WITH STENT PLACEMENT      x 1    PROSTATE BIOPSY  2023    PROSTATE SURGERY N/A 3/6/2023    ULTRASOUND GUIDED TRANSPERINEAL IMPLANT OF SPACEOAR performed by Demetris Singer MD at [facility] OR    TONSILLECTOMY      as child       Prior to Admission medications    Medication Sig Start Date End Date Taking? Authorizing Provider   rosuvastatin (CRESTOR) 10 MG tablet TAKE 1 TABLET BY MOUTH DAILY 8/30/24  Yes Hilario Allison DO   Semaglutide (OZEMPIC, 1 MG/DOSE, SC) Inject into the skin once a week   Yes Emily Vega MD   atenolol (TENORMIN) 25 MG tablet TAKE 1 TABLET BY MOUTH DAILY 6/6/24  Yes ScroccGiovanni dawson MD   Ascorbic Acid (VITAMIN C) 500 MG tablet Take 1 tablet by mouth daily   Yes Emily Vega MD   aspirin 325 MG tablet Take 1 tablet by mouth daily   Yes Emily Vega MD   lisinopril (PRINIVIL;ZESTRIL) 5 MG tablet Take 1 tablet by mouth daily 8/23/23 8/17/24  Hilario Allison DO   magnesium oxide (MAG-OX) 400 MG tablet Take 1 tablet by mouth daily    ProviderEmily MD   VITAMIN D PO Take 1 tablet by mouth daily    Emily Vega MD   Glucosamine HCl (GLUCOSAMINE PO) Take 1 tablet by mouth daily    Emily Vega MD   zinc gluconate 50 MG tablet Take 1 tablet by mouth daily    Emily Vega MD   glipiZIDE (GLUCOTROL XL) 10 MG extended release tablet

## 2024-09-03 NOTE — DISCHARGE INSTRUCTIONS
[facility] Department  [place] Xkfwry-[phone]  Dr. Dieter Calloway   Post-Pain Block/Radiofrequency  Home Going Instructions    1-Go home, rest for the remainder of the day  2-Please do not lift over 20 pounds the day of the injection  3-If you received sedation No: alcohol, driving, operating lawn mowers, plows, tractors or other dangerous equipment until next morning. Do not make important decisions or sign legal documents for 24 hours. You may experience light headedness, dizziness, nausea or sleepiness after sedation. Do not stay alone. A responsible adult must be with you for 24 hours. You could be nauseated from the medications you have received. Your IV site may be sore and bruised.    4-No dietary restrictions     5-Resume all medications the same day, blood thinners to be resumed 24 hours after injection if you were instructed to stop any.    6-Keep the surgical site clean and dry, you may shower the next morning and remove the      dressing.     7- No sitz baths, tub baths or hot tubs/swimming for 24 hours.       8- If you have any pain at the injection site(s), application of an ice pack to the area should be       helpful, 20 minutes on/20 minutes off for next 48 hours.  9- Call [facility] immediately at if you develop.  Fever greater than 100.4 F  Have bleeding or drainage from the puncture site  Have progressive Leg/arm numbness and or weakness  Loss of control of bowel and or bladder (wet/soil yourself)  Severe headache with inability to lift head  10-You may return to work the next day

## 2024-10-03 ENCOUNTER — OFFICE VISIT (OUTPATIENT)
Dept: CARDIOLOGY CLINIC | Age: 59
End: 2024-10-03
Payer: COMMERCIAL

## 2024-10-03 VITALS
HEART RATE: 73 BPM | BODY MASS INDEX: 40.48 KG/M2 | SYSTOLIC BLOOD PRESSURE: 128 MMHG | RESPIRATION RATE: 18 BRPM | DIASTOLIC BLOOD PRESSURE: 60 MMHG | HEIGHT: 67 IN | WEIGHT: 257.9 LBS

## 2024-10-03 DIAGNOSIS — I25.10 CORONARY ARTERY DISEASE, UNSPECIFIED VESSEL OR LESION TYPE, UNSPECIFIED WHETHER ANGINA PRESENT, UNSPECIFIED WHETHER NATIVE OR TRANSPLANTED HEART: Primary | ICD-10-CM

## 2024-10-03 PROCEDURE — 3078F DIAST BP <80 MM HG: CPT | Performed by: INTERNAL MEDICINE

## 2024-10-03 PROCEDURE — 3074F SYST BP LT 130 MM HG: CPT | Performed by: INTERNAL MEDICINE

## 2024-10-03 PROCEDURE — 93000 ELECTROCARDIOGRAM COMPLETE: CPT | Performed by: INTERNAL MEDICINE

## 2024-10-03 PROCEDURE — 99214 OFFICE O/P EST MOD 30 MIN: CPT | Performed by: INTERNAL MEDICINE

## 2024-10-03 NOTE — PROGRESS NOTES
TEDDY CHRISTIANO   OFFICE VISIT  : 65    PROBLEM LIST:  Acute non-ST segment elevation myocardial infarction, 2/15/09.  Heart catheterization, 09:  Near normal LV systolic function, minimal inferobasal hypokinesis, EF 55%.  65-70% mid LAD, 95% critical proximal right coronary artery lesion.  Status post balloon angioplasty and placement of 3.0 x 20 mm long Liberte drug-eluting stent.   Atypical chest discomfort, 3/8/09.  Negative cardiac enzymes.  Discharged home for outpatient stress test.  Outpatient exercise stress test, 3/9/09:  Fair exercise tolerance with no symptoms or EKG changes of ischemia.  Normal perfusion with resting EF 51% increasing to 85% with no wall motion abnormalities.     CC: CAD    HPI: The patient presents today for follow up of his CAD status post stent in  with residual LAD disease. Stress normal .    No CP or SOB.     BP okay.    Past Medical History:   Diagnosis Date    CAD (coronary artery disease)     Diabetes mellitus (HCC)     HTN (hypertension)     Hyperlipemia     MI, old     2 1/2    Prostate cancer (HCC) 12/15/2022     No Known Allergies    Current Outpatient Medications   Medication Sig Dispense Refill    Nutritional Supplements (ADULT NUTRITIONAL SUPPLEMENT PO) Take by mouth Indications: beet juice      rosuvastatin (CRESTOR) 10 MG tablet TAKE 1 TABLET BY MOUTH DAILY 90 tablet 3    Semaglutide (OZEMPIC, 1 MG/DOSE, SC) Inject into the skin once a week      atenolol (TENORMIN) 25 MG tablet TAKE 1 TABLET BY MOUTH DAILY 90 tablet 3    lisinopril (PRINIVIL;ZESTRIL) 5 MG tablet Take 1 tablet by mouth daily 90 tablet 3    magnesium oxide (MAG-OX) 400 MG tablet Take 1 tablet by mouth daily      Glucosamine HCl (GLUCOSAMINE PO) Take 1 tablet by mouth daily      glipiZIDE (GLUCOTROL XL) 10 MG extended release tablet Take 1 tablet by mouth daily (with breakfast) for 14 days New dose 2021 14 tablet 1    metFORMIN (GLUCOPHAGE) 500 MG tablet Take 1 tablet by mouth 2

## 2024-10-08 ENCOUNTER — OFFICE VISIT (OUTPATIENT)
Dept: PAIN MANAGEMENT | Age: 59
End: 2024-10-08
Payer: COMMERCIAL

## 2024-10-08 ENCOUNTER — PREP FOR PROCEDURE (OUTPATIENT)
Dept: PAIN MANAGEMENT | Age: 59
End: 2024-10-08

## 2024-10-08 VITALS
HEIGHT: 67 IN | RESPIRATION RATE: 16 BRPM | SYSTOLIC BLOOD PRESSURE: 143 MMHG | OXYGEN SATURATION: 94 % | DIASTOLIC BLOOD PRESSURE: 85 MMHG | TEMPERATURE: 97.1 F | HEART RATE: 71 BPM | WEIGHT: 257 LBS | BODY MASS INDEX: 40.34 KG/M2

## 2024-10-08 DIAGNOSIS — G89.29 CHRONIC BILATERAL LOW BACK PAIN WITH BILATERAL SCIATICA: ICD-10-CM

## 2024-10-08 DIAGNOSIS — M51.9 LUMBAR DISC DISORDER: ICD-10-CM

## 2024-10-08 DIAGNOSIS — M54.41 CHRONIC BILATERAL LOW BACK PAIN WITH BILATERAL SCIATICA: ICD-10-CM

## 2024-10-08 DIAGNOSIS — M48.062 SPINAL STENOSIS OF LUMBAR REGION WITH NEUROGENIC CLAUDICATION: ICD-10-CM

## 2024-10-08 DIAGNOSIS — G89.4 CHRONIC PAIN SYNDROME: Primary | ICD-10-CM

## 2024-10-08 DIAGNOSIS — M54.42 CHRONIC BILATERAL LOW BACK PAIN WITH BILATERAL SCIATICA: ICD-10-CM

## 2024-10-08 DIAGNOSIS — M54.16 LUMBAR RADICULOPATHY: ICD-10-CM

## 2024-10-08 DIAGNOSIS — M54.16 LUMBAR RADICULOPATHY: Primary | ICD-10-CM

## 2024-10-08 PROCEDURE — 99213 OFFICE O/P EST LOW 20 MIN: CPT

## 2024-10-08 PROCEDURE — 99213 OFFICE O/P EST LOW 20 MIN: CPT | Performed by: PAIN MEDICINE

## 2024-10-08 PROCEDURE — 3077F SYST BP >= 140 MM HG: CPT | Performed by: PAIN MEDICINE

## 2024-10-08 PROCEDURE — 3079F DIAST BP 80-89 MM HG: CPT | Performed by: PAIN MEDICINE

## 2024-10-08 NOTE — PROGRESS NOTES
Juan Ramon BUTLER Patdoreen presents to the [facility] on 10/8/2024. Juan Ramon is complaining of pain in low back. Had injections  The pain is constant. The pain is described as shooting. Pain is rated on his best day at a 4, on his worst day at a 10, and on average at a 5 on the VAS scale. He took his last dose of  nothing .     Any procedures since your last visit: Yes, with 80 % relief.    Pacemaker or defibrillator: No managed by .    He is  on NSAIDS and is  on anticoagulation medications to include ASA and is managed by Dr. Allison.     Medication Contract and Consent for Opioid Use Documents Filed        No documents found                    BP (!) 143/85   Pulse 71   Temp 97.1 °F (36.2 °C)   Resp 16   Ht 1.702 m (5' 7\")   Wt 116.6 kg (257 lb)   SpO2 94%   BMI 40.25 kg/m²      No LMP for male patient.

## 2024-10-08 NOTE — PROGRESS NOTES
[facility]        [address] [address]  Dept: [phone]        Follow up Note      Juan Ramon LUKE Patrone     Date of Visit:  10/08/24     CC:  Patient presents for follow up   Chief Complaint   Patient presents with    Follow-up     LUMBAR 1-2 EPIDURAL STEROID INJECTION       HPI:    Pain is better.  Change in quality of symptoms:no.    Medication side effects:not applicable .   Recent diagnostic testing:none.   Recent interventional procedures:L1-2 JOSEPH with 70% relief.    He has been on anticoagulation medications to include ASA and has not been on herbal supplements.  He is diabetic.     Imagin/2024 lumbar MRI w/o -  FINDINGS:  BONES/ALIGNMENT: Prominent hemangioma in the L3 vertebral body.  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  Trace grade 1  anterolisthesis of L4 on L5.  Diffuse disc desiccation.     SPINAL CORD: The conus terminates normally.     SOFT TISSUES: No paraspinal mass identified.     T12-L1: Loss of disc space height.  Early facet hypertrophy.  Mild right  foraminal stenosis.     L1-L2: Bilateral facet hypertrophy.  Near complete effacement of the CSF  space.  Moderate spinal canal stenosis.     L2-L3: Bilateral facet and ligamentum flavum hypertrophy with small disc  bulge.  Complete effacement of the CSF space with severe spinal canal  stenosis.  Severe bilateral subarticular zone stenosis.  Mild redundancy of  the nerve roots of the cauda equina above this level.  Moderate left greater  than right foraminal stenosis.     L3-L4: Bilateral facet and ligamentum flavum hypertrophy.  Minimal disc  bulge.  Moderate spinal canal stenosis.  Moderate bilateral foraminal  stenosis.  Moderate left greater than right subarticular zone stenosis.     L4-L5: Right greater than left facet hypertrophy.  Moderate right and mild  left foraminal stenosis.     L5-S1: Bilateral facet hypertrophy.     IMPRESSION:  Degenerative changes of

## 2024-10-21 ENCOUNTER — TELEPHONE (OUTPATIENT)
Dept: PAIN MANAGEMENT | Age: 59
End: 2024-10-21

## 2024-10-21 DIAGNOSIS — M54.16 LUMBAR RADICULOPATHY: Primary | ICD-10-CM

## 2024-10-21 NOTE — TELEPHONE ENCOUNTER
Call to Juan Ramon A Patrone that procedure was scheduled for 10/29/2024 and that [facility] should call him a few days before for the pre op call and between 2:00 PM and 4:00 PM  the business day before with the arrival time. Instructed Juan Ramon to hold ibuprofen for 24 hours, Celebrex, Mobic, and naprosyn for 4 days and any aspirin containing products, CoQ 10, or fish oil for 7 days. Instructed to call office back if any questions. Juan Ramon verbalized understanding.    Electronically signed by Christopher Maynard RN on 10/21/2024 at 1:44 PM

## 2024-10-29 ENCOUNTER — HOSPITAL ENCOUNTER (OUTPATIENT)
Age: 59
Setting detail: OUTPATIENT SURGERY
Discharge: HOME | End: 2024-10-29
Attending: PAIN MEDICINE | Admitting: PAIN MEDICINE
Payer: COMMERCIAL

## 2024-10-29 ENCOUNTER — HOSPITAL ENCOUNTER (OUTPATIENT)
Dept: GENERAL RADIOLOGY | Age: 59
Discharge: HOME | End: 2024-10-31
Attending: PAIN MEDICINE
Payer: COMMERCIAL

## 2024-10-29 VITALS
BODY MASS INDEX: 39.24 KG/M2 | RESPIRATION RATE: 18 BRPM | HEART RATE: 77 BPM | OXYGEN SATURATION: 95 % | SYSTOLIC BLOOD PRESSURE: 152 MMHG | DIASTOLIC BLOOD PRESSURE: 76 MMHG | TEMPERATURE: 97.8 F | WEIGHT: 250 LBS | HEIGHT: 67 IN

## 2024-10-29 DIAGNOSIS — R52 PAIN MANAGEMENT: ICD-10-CM

## 2024-10-29 LAB — GLUCOSE BLD-MCNC: 120 MG/DL (ref 74–99)

## 2024-10-29 PROCEDURE — 7100000010 HC PHASE II RECOVERY - FIRST 15 MIN: Performed by: PAIN MEDICINE

## 2024-10-29 PROCEDURE — 7100000011 HC PHASE II RECOVERY - ADDTL 15 MIN: Performed by: PAIN MEDICINE

## 2024-10-29 PROCEDURE — 6360000002 HC RX W HCPCS: Performed by: PAIN MEDICINE

## 2024-10-29 PROCEDURE — 3600000002 HC SURGERY LEVEL 2 BASE: Performed by: PAIN MEDICINE

## 2024-10-29 PROCEDURE — 2500000003 HC RX 250 WO HCPCS: Performed by: PAIN MEDICINE

## 2024-10-29 PROCEDURE — 2709999900 HC NON-CHARGEABLE SUPPLY: Performed by: PAIN MEDICINE

## 2024-10-29 PROCEDURE — 6360000004 HC RX CONTRAST MEDICATION: Performed by: PAIN MEDICINE

## 2024-10-29 PROCEDURE — 82962 GLUCOSE BLOOD TEST: CPT

## 2024-10-29 RX ORDER — IOPAMIDOL 612 MG/ML
INJECTION, SOLUTION INTRATHECAL PRN
Status: DISCONTINUED | OUTPATIENT
Start: 2024-10-29 | End: 2024-10-29 | Stop reason: ALTCHOICE

## 2024-10-29 RX ORDER — METHYLPREDNISOLONE ACETATE 40 MG/ML
INJECTION, SUSPENSION INTRA-ARTICULAR; INTRALESIONAL; INTRAMUSCULAR; SOFT TISSUE PRN
Status: DISCONTINUED | OUTPATIENT
Start: 2024-10-29 | End: 2024-10-29 | Stop reason: ALTCHOICE

## 2024-10-29 RX ORDER — LIDOCAINE HYDROCHLORIDE 5 MG/ML
INJECTION, SOLUTION INFILTRATION; INTRAVENOUS PRN
Status: DISCONTINUED | OUTPATIENT
Start: 2024-10-29 | End: 2024-10-29 | Stop reason: ALTCHOICE

## 2024-10-29 ASSESSMENT — PAIN - FUNCTIONAL ASSESSMENT: PAIN_FUNCTIONAL_ASSESSMENT: NONE - DENIES PAIN

## 2024-10-29 NOTE — OP NOTE
10/29/2024    Patient: Juan Ramon Justin  :  1965  Age:  59 y.o.  Sex:  male     PRE-OPERATIVE DIAGNOSIS: Lumbar disc displacement, lumbar radiculopathy.    POST-OPERATIVE DIAGNOSIS: Same.    PROCEDURE: Fluoroscopic guided therapeutic lumbar epidural steroid injection at the L1-2 level.    SURGEON: SHIRIN Calloway D.O.    ANESTHESIA: local    ESTIMATED BLOOD LOSS: None.  __________________________________________________    BRIEF HISTORY:  Juan Ramon Justin comes in today for the lumbar epidural injection at L1-2 level. The potential complications of this procedure were discussed with him again today.  He has elected to undergo the aforementioned procedure.    Juan Ramon’s complete History & Physical examination were reviewed in depth, a copy of which is in the chart.      DESCRIPTION OF PROCEDURE:    After confirming written and informed consent, a time-out was performed and Juan Ramon’s name and date of birth, the procedure to be performed as well as the plan for the location of the needle insertion were confirmed.    The patient was brought into the procedure room and placed in the prone position on the fluoroscopy table. A pillow was placed under the patient's lower abdomen/upper pelvis to increase lumbar interlaminar space. Standard monitors were placed, and vital signs were observed throughout the procedure. The area of the lumbar spine was prepped with chloraprep and draped in a sterile manner. The L1-2 interspace was identified and marked under AP fluoroscopy. The skin and subcutaneous tissues at the above level were anesthestized with 0.5% lidocaine. With intermittent fluoroscopy, an # 18 gauge 3 1/2 tuohy epidural needle was inserted and directed toward the interlaminar space. The needle was slowly advanced using loss of resistance technique and 5 cc glass syringe until the tip of the epidural needle has passed through the ligamentum flavum and entered the epidural space. AP and lateral fluoroscopic imaging was performed

## 2024-10-29 NOTE — DISCHARGE INSTRUCTIONS
[facility] Department  [place] Tvmawz-[phone]  Dr. Georges Calloway   Post-Pain Block/Radiofrequency  Home Going Instructions    1-Go home, rest for the remainder of the day  2-Please do not lift over 20 pounds the day of the injection  3-If you received sedation No: alcohol, driving, operating lawn mowers, plows, tractors or other dangerous equipment until next morning. Do not make important decisions or sign legal documents for 24 hours. You may experience light headedness, dizziness, nausea or sleepiness after sedation. Do not stay alone. A responsible adult must be with you for 24 hours. You could be nauseated from the medications you have received. Your IV site may be sore and bruised.    4-No dietary restrictions     5-Resume all medications the same day, blood thinners to be resumed 24 hours after injection if you were instructed to stop any.    6-Keep the surgical site clean and dry, you may shower the next morning and remove the      dressing.     7- No sitz baths, tub baths or hot tubs/swimming for 24 hours.       8- If you have any pain at the injection site(s), application of an ice pack to the area should be       helpful, 20 minutes on/20 minutes off for next 48 hours.  9- Call [facility] immediately at if you develop.  Fever greater than 100.4 F  Have bleeding or drainage from the puncture site  Have progressive Leg/arm numbness and or weakness  Loss of control of bowel and or bladder (wet/soil yourself)  Severe headache with inability to lift head  10-You may return to work the next day

## 2024-11-15 ENCOUNTER — OFFICE VISIT (OUTPATIENT)
Dept: PAIN MANAGEMENT | Age: 59
End: 2024-11-15
Payer: COMMERCIAL

## 2024-11-15 VITALS
HEART RATE: 76 BPM | TEMPERATURE: 98.4 F | OXYGEN SATURATION: 95 % | BODY MASS INDEX: 39.24 KG/M2 | RESPIRATION RATE: 16 BRPM | WEIGHT: 250 LBS | DIASTOLIC BLOOD PRESSURE: 76 MMHG | SYSTOLIC BLOOD PRESSURE: 118 MMHG | HEIGHT: 67 IN

## 2024-11-15 DIAGNOSIS — G89.4 CHRONIC PAIN SYNDROME: Primary | ICD-10-CM

## 2024-11-15 DIAGNOSIS — M51.9 LUMBAR DISC DISORDER: ICD-10-CM

## 2024-11-15 DIAGNOSIS — G89.29 CHRONIC BILATERAL LOW BACK PAIN WITH BILATERAL SCIATICA: ICD-10-CM

## 2024-11-15 DIAGNOSIS — M54.41 CHRONIC BILATERAL LOW BACK PAIN WITH BILATERAL SCIATICA: ICD-10-CM

## 2024-11-15 DIAGNOSIS — M48.062 SPINAL STENOSIS OF LUMBAR REGION WITH NEUROGENIC CLAUDICATION: ICD-10-CM

## 2024-11-15 DIAGNOSIS — M54.42 CHRONIC BILATERAL LOW BACK PAIN WITH BILATERAL SCIATICA: ICD-10-CM

## 2024-11-15 DIAGNOSIS — M54.16 LUMBAR RADICULOPATHY: ICD-10-CM

## 2024-11-15 PROCEDURE — 99213 OFFICE O/P EST LOW 20 MIN: CPT | Performed by: PAIN MEDICINE

## 2024-11-15 NOTE — PROGRESS NOTES
Juan Ramon Justin presents to the [facility] on 11/15/2024. Juan Ramon is complaining of pain nowhere. The pain is  na . The pain is described as na. Pain is rated on his best day at a 0, on his worst day at a 0, and on average at a 0 on the VAS scale. He took his last dose of  na  na.     Any procedures since your last visit: Yes, with 80% relief    Pacemaker or defibrillator: No managed by na.    He is  on NSAIDS and is  on anticoagulation medications to include ASA and is managed by Dr. Allison.     Do you want someone present when the provider examines you? No    Medication Contract and Consent for Opioid Use Documents Filed        No documents found                    There were no vitals taken for this visit.     No LMP for male patient.    
tablet Take 1 tablet by mouth daily as needed for Erectile Dysfunction 4/28/22  Yes Hilario Allison DO   Ascorbic Acid (VITAMIN C) 500 MG tablet Take 1 tablet by mouth daily   Yes Provider, MD Emily   aspirin 325 MG tablet Take 1 tablet by mouth daily   Yes Provider, MD Emily   lisinopril (PRINIVIL;ZESTRIL) 5 MG tablet Take 1 tablet by mouth daily 8/23/23 10/3/24  Hilario Allison DO   glipiZIDE (GLUCOTROL XL) 10 MG extended release tablet Take 1 tablet by mouth daily (with breakfast) for 14 days New dose 11/22/2021 9/28/22 10/3/24  Molly Paez DO   metFORMIN (GLUCOPHAGE) 500 MG tablet Take 1 tablet by mouth 2 times daily (with meals) for 14 days New directions as of 11/22/2021 9/28/22 10/3/24  Molly Paez DO       No Known Allergies    Social History     Socioeconomic History    Marital status:      Spouse name: Not on file    Number of children: Not on file    Years of education: Not on file    Highest education level: Not on file   Occupational History    Not on file   Tobacco Use    Smoking status: Never    Smokeless tobacco: Never   Vaping Use    Vaping status: Never Used   Substance and Sexual Activity    Alcohol use: No    Drug use: No    Sexual activity: Defer   Other Topics Concern    Not on file   Social History Narrative    Not on file     Social Determinants of Health     Financial Resource Strain: Low Risk  (11/19/2021)    Overall Financial Resource Strain (CARDIA)     Difficulty of Paying Living Expenses: Not hard at all   Food Insecurity: No Food Insecurity (11/19/2021)    Hunger Vital Sign     Worried About Running Out of Food in the Last Year: Never true     Ran Out of Food in the Last Year: Never true   Transportation Needs: Not on file   Physical Activity: Not on file   Stress: Not on file   Social Connections: Not on file   Intimate Partner Violence: Not on file   Housing Stability: Not on file       Family History   Problem Relation Age of Onset    Cancer Mother

## 2025-02-13 ENCOUNTER — OFFICE VISIT (OUTPATIENT)
Dept: PAIN MANAGEMENT | Age: 60
End: 2025-02-13
Payer: COMMERCIAL

## 2025-02-13 VITALS
DIASTOLIC BLOOD PRESSURE: 83 MMHG | BODY MASS INDEX: 39.24 KG/M2 | HEART RATE: 68 BPM | SYSTOLIC BLOOD PRESSURE: 145 MMHG | HEIGHT: 67 IN | TEMPERATURE: 97.8 F | OXYGEN SATURATION: 96 % | WEIGHT: 250 LBS | RESPIRATION RATE: 18 BRPM

## 2025-02-13 DIAGNOSIS — G89.29 CHRONIC BILATERAL LOW BACK PAIN WITH BILATERAL SCIATICA: ICD-10-CM

## 2025-02-13 DIAGNOSIS — M54.42 CHRONIC BILATERAL LOW BACK PAIN WITH BILATERAL SCIATICA: ICD-10-CM

## 2025-02-13 DIAGNOSIS — M48.062 SPINAL STENOSIS OF LUMBAR REGION WITH NEUROGENIC CLAUDICATION: ICD-10-CM

## 2025-02-13 DIAGNOSIS — M51.9 LUMBAR DISC DISORDER: ICD-10-CM

## 2025-02-13 DIAGNOSIS — M54.16 LUMBAR RADICULOPATHY: ICD-10-CM

## 2025-02-13 DIAGNOSIS — G89.4 CHRONIC PAIN SYNDROME: Primary | ICD-10-CM

## 2025-02-13 DIAGNOSIS — M54.41 CHRONIC BILATERAL LOW BACK PAIN WITH BILATERAL SCIATICA: ICD-10-CM

## 2025-02-13 PROCEDURE — 99213 OFFICE O/P EST LOW 20 MIN: CPT | Performed by: PHYSICIAN ASSISTANT

## 2025-02-13 RX ORDER — PREGABALIN 25 MG/1
CAPSULE ORAL
Qty: 90 CAPSULE | Refills: 0 | Status: SHIPPED | OUTPATIENT
Start: 2025-02-13 | End: 2025-03-15

## 2025-02-13 NOTE — PROGRESS NOTES
Juan Ramon Justin presents to the [facility] on 2/13/2025. Juan Ramon is complaining of pain in his low back. The pain is constant. The pain is described as aching, dull, and sharp. Pain is rated on his best day at a 5, on his worst day at a 10, and on average at a 8 on the VAS scale.    Any procedures since your last visit: Yes, with 95 % relief for 2 weeks then pain returned.    Pacemaker or defibrillator: No      He is not on NSAIDS and is  on anticoagulation medications to include ASA and is managed by Pollo Hollins DO  .     Do you want someone present when the provider examines you? No    Medication Contract and Consent for Opioid Use Documents Filed        No documents found                    BP (!) 145/83   Pulse 68   Temp 97.8 °F (36.6 °C) (Infrared)   Resp 18   Ht 1.702 m (5' 7.01\")   Wt 113.4 kg (250 lb)   SpO2 96%   BMI 39.15 kg/m²      No LMP for male patient.

## 2025-02-13 NOTE — PROGRESS NOTES
[facility]  [address] [place] [id number]    Follow up Note      Juan Ramon Justin     Date of Visit:  2025    CC:  Patient presents for follow up   Chief Complaint   Patient presents with    Follow-up     Fluoroscopic guided therapeutic lumbar epidural steroid injection at the L1-2 level.            HPI:    Pain is back to baseline.  Injection helped for 2 weeks.   Appropriate analgesia with current medications regimen: n/a  Change in quality of symptoms:no.    Medication side effects:not applicable .   Recent diagnostic testing:none.   Recent interventional procedures:none.    He has been on anticoagulation medications to include ASA and has not been on herbal supplements.  He is diabetic.     Imagin/2024 lumbar MRI w/o -  FINDINGS:  BONES/ALIGNMENT: Prominent hemangioma in the L3 vertebral body.  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  Trace grade 1  anterolisthesis of L4 on L5.  Diffuse disc desiccation.     SPINAL CORD: The conus terminates normally.     SOFT TISSUES: No paraspinal mass identified.     T12-L1: Loss of disc space height.  Early facet hypertrophy.  Mild right  foraminal stenosis.     L1-L2: Bilateral facet hypertrophy.  Near complete effacement of the CSF  space.  Moderate spinal canal stenosis.     L2-L3: Bilateral facet and ligamentum flavum hypertrophy with small disc  bulge.  Complete effacement of the CSF space with severe spinal canal  stenosis.  Severe bilateral subarticular zone stenosis.  Mild redundancy of  the nerve roots of the cauda equina above this level.  Moderate left greater  than right foraminal stenosis.     L3-L4: Bilateral facet and ligamentum flavum hypertrophy.  Minimal disc  bulge.  Moderate spinal canal stenosis.  Moderate bilateral foraminal  stenosis.  Moderate left greater than right subarticular zone stenosis.     L4-L5: Right greater than left facet hypertrophy.  Moderate right and mild  left foraminal

## 2025-02-14 ENCOUNTER — TELEPHONE (OUTPATIENT)
Dept: PAIN MANAGEMENT | Age: 60
End: 2025-02-14

## 2025-02-14 NOTE — TELEPHONE ENCOUNTER
Discussed case with Dr. Calloway.  Recommend caudal JOSEPH and surgical consultation if pregabalin ineffective.  Discussed with patient.  He would like to give the pregabalin a trial for several weeks to note effectiveness.  He will let us know if he wants to move on to any other modalities.  All questions answered.

## 2025-07-16 ENCOUNTER — OFFICE VISIT (OUTPATIENT)
Dept: CARDIOLOGY CLINIC | Age: 60
End: 2025-07-16
Payer: COMMERCIAL

## 2025-07-16 VITALS
OXYGEN SATURATION: 98 % | DIASTOLIC BLOOD PRESSURE: 78 MMHG | HEART RATE: 69 BPM | TEMPERATURE: 96.8 F | HEIGHT: 67 IN | RESPIRATION RATE: 18 BRPM | WEIGHT: 252.7 LBS | SYSTOLIC BLOOD PRESSURE: 136 MMHG | BODY MASS INDEX: 39.66 KG/M2

## 2025-07-16 DIAGNOSIS — I25.10 CORONARY ARTERY DISEASE, UNSPECIFIED VESSEL OR LESION TYPE, UNSPECIFIED WHETHER ANGINA PRESENT, UNSPECIFIED WHETHER NATIVE OR TRANSPLANTED HEART: Primary | ICD-10-CM

## 2025-07-16 DIAGNOSIS — I10 HYPERTENSION, UNSPECIFIED TYPE: ICD-10-CM

## 2025-07-16 PROCEDURE — 3078F DIAST BP <80 MM HG: CPT | Performed by: INTERNAL MEDICINE

## 2025-07-16 PROCEDURE — 3075F SYST BP GE 130 - 139MM HG: CPT | Performed by: INTERNAL MEDICINE

## 2025-07-16 PROCEDURE — 93000 ELECTROCARDIOGRAM COMPLETE: CPT | Performed by: INTERNAL MEDICINE

## 2025-07-16 PROCEDURE — 99214 OFFICE O/P EST MOD 30 MIN: CPT | Performed by: INTERNAL MEDICINE

## 2025-07-16 PROCEDURE — G2211 COMPLEX E/M VISIT ADD ON: HCPCS | Performed by: INTERNAL MEDICINE

## 2025-07-16 RX ORDER — ROSUVASTATIN CALCIUM 10 MG/1
10 TABLET, COATED ORAL DAILY
Qty: 90 TABLET | Refills: 3 | Status: SHIPPED | OUTPATIENT
Start: 2025-07-16

## 2025-07-16 RX ORDER — ATENOLOL 25 MG/1
25 TABLET ORAL DAILY
Qty: 90 TABLET | Refills: 3 | Status: SHIPPED | OUTPATIENT
Start: 2025-07-16

## 2025-07-16 RX ORDER — LISINOPRIL 5 MG/1
5 TABLET ORAL DAILY
Qty: 90 TABLET | Refills: 3 | Status: SHIPPED | OUTPATIENT
Start: 2025-07-16 | End: 2026-07-11

## 2025-07-16 NOTE — PROGRESS NOTES
TEDDY CHRISTIANO   OFFICE VISIT  : 65    PROBLEM LIST:  Acute non-ST segment elevation myocardial infarction, 2/15/09.  Heart catheterization, 09:  Near normal LV systolic function, minimal inferobasal hypokinesis, EF 55%.  65-70% mid LAD, 95% critical proximal right coronary artery lesion.  Status post balloon angioplasty and placement of 3.0 x 20 mm long Liberte drug-eluting stent.   Atypical chest discomfort, 3/8/09.  Negative cardiac enzymes.  Discharged home for outpatient stress test.  Outpatient exercise stress test, 3/9/09:  Fair exercise tolerance with no symptoms or EKG changes of ischemia.  Normal perfusion with resting EF 51% increasing to 85% with no wall motion abnormalities.     CC: CAD    HPI: The patient presents today for follow up of his CAD status post stent in  with residual LAD disease. Stress normal .    No CP or SOB.     Past Medical History:   Diagnosis Date    CAD (coronary artery disease)     Diabetes mellitus (HCC)     HTN (hypertension)     Hyperlipemia     Lumbar pain     MI, old     2 1/2    Prostate cancer (HCC) 12/15/2022     No Known Allergies    Current Outpatient Medications   Medication Sig Dispense Refill    rosuvastatin (CRESTOR) 10 MG tablet TAKE 1 TABLET BY MOUTH DAILY 90 tablet 3    Semaglutide (OZEMPIC, 1 MG/DOSE, SC) Inject into the skin once a week      atenolol (TENORMIN) 25 MG tablet TAKE 1 TABLET BY MOUTH DAILY 90 tablet 3    lisinopril (PRINIVIL;ZESTRIL) 5 MG tablet Take 1 tablet by mouth daily 90 tablet 3    magnesium oxide (MAG-OX) 400 MG tablet Take 1 tablet by mouth daily      Glucosamine HCl (GLUCOSAMINE PO) Take 1 tablet by mouth daily      zinc gluconate 50 MG tablet Take 1 tablet by mouth daily      glipiZIDE (GLUCOTROL XL) 10 MG extended release tablet Take 1 tablet by mouth daily (with breakfast) for 14 days New dose 2021 14 tablet 1    metFORMIN (GLUCOPHAGE) 500 MG tablet Take 1 tablet by mouth 2 times daily (with meals) for 14 days

## (undated) DEVICE — 3M™ RED DOT™ MONITORING ELECTRODE WITH FOAM TAPE AND STICKY GEL 2560, 50/BAG, 20/CASE, 72/PLT: Brand: RED DOT™

## (undated) DEVICE — MEDICINE CUP, GRADUATED, STER: Brand: MEDLINE

## (undated) DEVICE — SYRINGE, LUER LOCK, 10ML: Brand: MEDLINE

## (undated) DEVICE — GAUZE,SPONGE,4"X4",8PLY,STRL,LF,10/TRAY: Brand: MEDLINE

## (undated) DEVICE — BANDAGE ADH W1XL3IN NAT FAB WVN FLX DURABLE N ADH PD SEAL

## (undated) DEVICE — 12 ML SYRINGE,LUER-LOCK TIP: Brand: MONOJECT

## (undated) DEVICE — KIT SURG PREP POVIDONE IOD PRESATURATED PAINT WET FOR UNIV

## (undated) DEVICE — 6 ML SYRINGE LUER-LOCK TIP: Brand: MONOJECT

## (undated) DEVICE — SYRINGE MED 5ML STD CLR PLAS LUERLOCK TIP N CTRL DISP

## (undated) DEVICE — MARKER,SKIN,WI/RULER AND LABELS: Brand: MEDLINE

## (undated) DEVICE — DRAPE,REIN 53X77,STERILE: Brand: MEDLINE

## (undated) DEVICE — TOWEL,OR,DSP,ST,BLUE,STD,6/PK,12PK/CS: Brand: MEDLINE

## (undated) DEVICE — Device: Brand: PORTEX

## (undated) DEVICE — NEEDLE HYPO 25GA L1.5IN BLU POLYPR HUB S STL REG BVL STR

## (undated) DEVICE — COVER PRB L11.9CM TAPR L3.8X61CM TRNSPAR SFT PLIABLE

## (undated) DEVICE — NEEDLE HYPO 18GA L1.5IN PNK POLYPR HUB S STL REG BVL STR

## (undated) DEVICE — GLOVE ORANGE PI 7 1/2   MSG9075

## (undated) DEVICE — NON-DEHP CATHETER EXTENSION SET, MALE LUER LOCK ADAPTER

## (undated) DEVICE — NEEDLE,22GX1.5",REG,BEVEL: Brand: MEDLINE

## (undated) DEVICE — GEL US 20GM NONIRRITATING OVERWRAPPED FILE PCH TRNSMIT

## (undated) DEVICE — GLOVE ORANGE PI 8   MSG9080

## (undated) DEVICE — SET SURG BASIN MAYO REUSABLE